# Patient Record
Sex: MALE | Race: BLACK OR AFRICAN AMERICAN | Employment: FULL TIME | URBAN - METROPOLITAN AREA
[De-identification: names, ages, dates, MRNs, and addresses within clinical notes are randomized per-mention and may not be internally consistent; named-entity substitution may affect disease eponyms.]

---

## 2017-06-08 ENCOUNTER — HOSPITAL ENCOUNTER (OUTPATIENT)
Dept: GENERAL RADIOLOGY | Age: 52
Discharge: HOME | End: 2017-06-08
Payer: COMMERCIAL

## 2017-06-08 DIAGNOSIS — J44.1 OBSTRUCTIVE CHRONIC BRONCHITIS WITH EXACERBATION (HCC): ICD-10-CM

## 2017-06-08 PROCEDURE — 71020 XR CHEST PA LAT: CPT

## 2017-08-07 ENCOUNTER — HOSPITAL ENCOUNTER (OUTPATIENT)
Dept: PHYSICAL THERAPY | Age: 52
Discharge: HOME | End: 2017-08-07
Payer: COMMERCIAL

## 2017-08-07 PROCEDURE — 97161 PT EVAL LOW COMPLEX 20 MIN: CPT

## 2017-08-07 PROCEDURE — 97110 THERAPEUTIC EXERCISES: CPT

## 2017-08-07 NOTE — PROGRESS NOTES
PT DAILY TREATMENT NOTE/FOOT AND ANKLE EVAL 3-16    Patient Name: Ari Delacruz  Date:2017  : 1965  [x]  Patient  Verified  Payor: BLUE CROSS / Plan: [facility] PPO / Product Type: PPO /    In time: 9:05  Out time: 9:46  Total Treatment Time (min): 33   Total Timed Codes (min): 8  Visit #: 1 of 8    Treatment Area: Pain of left heel [M79.672]  Plantar fasciitis of left foot [M72.2]    SUBJECTIVE  Pain Level (0-10 scale): 410  []constant [x]intermittent []improving []worsening []no change since onset    Any medication changes, allergies to medications, adverse drug reactions, diagnosis change, or new procedure performed?: [x] No    [] Yes (see summary sheet for update)  Subjective functional status/changes:     PLOF: Ind with ADLs/IADLs, played softball, playing with grandchildren   Limitations to PLOF: Pain limits time spent on his feet   Mechanism of Injury: S/P plantar fascitis (); pain started a couple months before surgery, went from a gradual ache to aching all the time, the same doctor performed the plantar fascitis surgery on his right foot  Current symptoms/Complaints: Pain on medial border and back of L foot  Previous Treatment/Compliance: Pt reports he has been Icing his foot when he thinks about it, has been wearing boot up until last week  PMHx/Surgical Hx:  R foot plantar fascitis surgery, Arthroscopic surgery on R knee for torn cartilage    Work Hx:  for [employer], requires being on feet for long periods of  time    Living Situation: lives with wife and son in a  two-story home with bedroom downstairs   Pt Goals:  To be pain free  Barriers: []pain []financial []time []transportation []other  Motivation: High  Substance use: []Alcohol [x]Tobacco []other: smokes 1/2 pack   FABQ Score: []low []elevate  Cognition: A & O x 4    Other:    OBJECTIVE/EXAMINATION    33 min [x]Eval                  []Re-Eval       8 min Therapeutic Exercise:  AROM/Gross strength of ankle/hip/knee   Rationale: increase ROM and increase strength to improve the patients ability to perform ADLs          With   [x] TE   [] TA   [] neuro   [] other: Patient Education: [x] Review HEP    [] Progressed/Changed HEP based on:   [] positioning   [] body mechanics   [] transfers   [] heat/ice application    [] other:      Other Objective/Functional Measures:     Physical Therapy Evaluation  - Foot and Ankle    Gait: [] Normal    [x] Abnormal    [] Antalgic    [] NWB    Device:    Describe: Pt ambulates with antalgic gait increased time on R LE with excessive pronation of L foot     ROM/Strength  [] Unable to assess at this time      AROM        PROM            Strength (1-5)   Left Right Left Right Left  Right   Dorsiflexion -19 -14   4- 4+   Plantarflexion 28 32   4- 4+   Inversion 22 29   4- 4   Eversion 6 8   4- 4   Great Toe Ext     4+ 4+   Great Toe Flex         Gross Strength:   B Hip Flexion: 4+/5   B Knee Extension: 5/5   B Knee Flexion: 4+/5    Flexibility: [] Unable to assess at this time  Gastroc:    (L) Tightness [] WNL   [] Min   [x] Mod   [] Severe    (R) Tightness [] WNL   [] Min   [] Mod   [] Severe  Soleus:    (L) Tightness [] WNL   [] Min   [x] Mod   [] Severe    (R) Tightness [] WNL   [] Min   [x] Mod   [] Severe  Other: Hamstring     (L) Tightness [] WNL   [] Min   [x] Mod   [] Severe    (R) Tightness [] WNL   [] Min   [x] Mod   [] Severe    Palpation:   Location: Medial/lateral border of foot  Patient's Pain Response: [] Min   [x] Mod   [] Severe  Location: Calcaneus  Patient's Pain Response: [] Min   [x] Mod   [] Severe    Optional Tests:  Balance/Stork Test: touches/60sec (L): (R):    Single Leg Hop:   (L) Distance(ft):  (R) Distance(ft):  (L)/(R)%:   (L) Time(sec):  (R) Time(sec): (L)/(R)%:      Sub-talor alignment: [] Neutral     [x] Pronation      [] Supination    Forefoot alignment:  [] Neutral     [] Varus            [x] Valgus    Swelling:   Left (cm) Right (cm) Figure 8:     Midfoot:      Malleoli Level:     MTH:        Anterior Drawer: [x] Neg    [] Pos  Posterior Drawer:  [] Neg    [] Pos  Inversion Stress:  [x] Neg    [] Pos  Talar Tilt:   [x] Neg    [] Pos  Eversion Stress:  [x] Neg    [] Pos  Teresa's Sign:  [] Neg    [] Pos  Caceres Test: [] Neg    [] Pos    Other tests/ comments:       Pain Level (0-10 scale) post treatment: 4/10    ASSESSMENT/Changes in Function:      See POC    Patient will continue to benefit from skilled PT services to modify and progress therapeutic interventions, address functional mobility deficits, address ROM deficits, address strength deficits and analyze and address soft tissue restrictions to attain remaining goals.      [x]  See Plan of Care  []  See progress note/recertification  []  See Discharge Summary         Progress towards goals / Updated goals:    See POC    PLAN  []  Upgrade activities as tolerated     [x]  Continue plan of care  []  Update interventions per flow sheet       []  Discharge due to:_  []  Other:Alondra Stoddard 8/7/2017  9:02 AM

## 2017-08-07 NOTE — PROGRESS NOTES
[facility] [place] [employer] [facility] [employer]  [address] [facility]  [phone] fax    Plan of Care/ Statement of Necessity for Physical Therapy Services    Patient name: Lety Shaikh Start of Care: 2017   Referral source: Bryanna David DPM : 1965    Medical Diagnosis: Pain of left heel [M79.672]  Plantar fasciitis of left foot [M72.2]   Onset Date:    Treatment Diagnosis: S/P Plantar Fascitis    Prior Hospitalization: see medical history Provider#: [id number]   Medications: Verified on Patient summary List    Comorbidities: HTN, asthma    Prior Level of Function: Ind with ADLs/IADLs, plays softball, playing with grandchildren    The Plan of Care and following information is based on the information from the initial evaluation. Assessment/ key information: Pt is a 49 y.o. M who c/o L heel pain s/p plantar fascitis surgery on 17. Pt reports the pain in his L foot started a couple months before the surgery, going from a gradual ache to aching all the time. Post surgery, pt has been walking in a boot up until last week. Pt reports he has been icing it when he remembers and denies use of pain medication. Currently pt has mild pain on medial border and back of his foot. Pt has not returned to work since surgery and most of his limitations are related to standing/walking for >15 minutes and stair negotiation. Pt has weakness and decreased AROM in L ankle dorsiflexion/plantar flexion, tight B gastroc/soleus, and tight hamstrings. Pt also reports occasional pins and needles at the bottom of L foot when standing/walking for extended periods of time. Pt ambulates with antalgic gait with increased time on R LE and excessive pronation of L foot. Pt would benefit from skille PT in order to increase L ankle ROM and strength, improve walking mechanics, and calf/hamstring flexibility in order to return to work.        Evaluation Complexity History MEDIUM Complexity : 1-2 comorbidities / personal factors will impact the outcome/ POC ; Examination LOW Complexity : 1-2 Standardized tests and measures addressing body structure, function, activity limitation and / or participation in recreation  ;Presentation LOW Complexity : Stable, uncomplicated  ;Clinical Decision Making MEDIUM Complexity : FOTO score of 26-74  Overall Complexity Rating: LOW   Problem List: pain affecting function, decrease ROM, decrease strength and decrease flexibility/ joint mobility   Treatment Plan may include any combination of the following: Therapeutic exercise, Therapeutic activities, Neuromuscular re-education, Gait/balance training, Manual therapy and Stair training  Patient / Family readiness to learn indicated by: asking questions, trying to perform skills and interest  Persons(s) to be included in education: patient (P)  Barriers to Learning/Limitations: None  Patient Goal (s): To have less pain  Patient Self Reported Health Status: good  Rehabilitation Potential: excellent    Short Term Goals: To be accomplished in 1 weeks:   1. Pt will demonstrate compliance with HEP in order to continue therapy at home. Long Term Goals: To be accomplished in 4 weeks:   1. Pt will increase FOTO score by 24 points in order to demonstrate in increase in function    2. Pt will increase AROM of L ankle dorsiflexion to neutral in order to improve heel strike during ambulation. 3. Pt will increase L ankle plantar flexion to 4+/5 in order to improve walking mechanics. 4. Pt will ambulate with equal time on B LE with proper heel strike in order to return to work. Frequency / Duration: Patient to be seen 2 times per week for 4 weeks.     Patient/ CarPatient/ Caregiver education and instruction: Diagnosis, prognosis, self care, activity modification and exercises   [x]  Plan of care has been reviewed with MARIE Mccormack 8/7/2017 10:10 AM    ________________________________________________________________________    I certify that the above Therapy Services are being furnished while the patient is under my care. I agree with the treatment plan and certify that this therapy is necessary.     Physician's Signature:____________________  Date:____________Time: _________    Please sign and return to [facility] [employer]  [address] [place] [address]  [phone] fax

## 2017-08-10 ENCOUNTER — HOSPITAL ENCOUNTER (OUTPATIENT)
Dept: PHYSICAL THERAPY | Age: 52
Discharge: HOME | End: 2017-08-10
Payer: COMMERCIAL

## 2017-08-10 PROCEDURE — 97110 THERAPEUTIC EXERCISES: CPT

## 2017-08-10 NOTE — PROGRESS NOTES
PT DAILY TREATMENT NOTE - [facility]     Patient Name: Sade Kothari  Date:8/10/2017  : 1965  [x]  Patient  Verified  Payor: BLUE CROSS / Plan: [facility] PPO / Product Type: PPO /    In time: 8:33  Out time: 9:06  Total Treatment Time (min): 33  Total Timed Codes (min): 33  Visit #: 2 of 8    Treatment Area: Pain of left heel [M79.672]  Plantar fasciitis of left foot [M72.2]    SUBJECTIVE  Pain Level (0-10 scale): 6-7/10  Any medication changes, allergies to medications, adverse drug reactions, diagnosis change, or new procedure performed?: [x] No    [] Yes (see summary sheet for update)  Subjective functional status/changes:   [] No changes reported  Pt reports he is feeling good this morning. He states he has not started doing all his exercise yet but did do the calf stretch where you lunge with your hands on the wall and noticed how tight his calves were. OBJECTIVE    33 min Therapeutic Exercise:  [x] See flow sheet :   Rationale: increase ROM, increase strength and improve coordination to improve the patients ability to perform ADLs    With   [x] TE   [] TA   [] neuro   [] other: Patient Education: [x] Review HEP    [] Progressed/Changed HEP based on:   [] positioning   [] body mechanics   [] transfers   [] heat/ice application    [] other:      Other Objective/Functional Measures:     Pt required cues for short foot   Pt tolerated all exercises with no pain  Pt has very tight gastroc and HS    Pain Level (0-10 scale) post treatment: 5.5/10    ASSESSMENT/Changes in Function:     Pt has initiated therapy but has not yet verbalized compliance with HEP    Patient will continue to benefit from skilled PT services to modify and progress therapeutic interventions, address functional mobility deficits, address ROM deficits, address strength deficits, analyze and address soft tissue restrictions and analyze and cue movement patterns to attain remaining goals.      [x]  See Plan of Care  []  See progress note/recertification  []  See Discharge Summary         Progress towards goals / Updated goals:    Short Term Goals: To be accomplished in 1 weeks:                         1. Pt will demonstrate compliance with HEP in order to continue therapy at home. Not met: pt. Reports he hasn't started HEP yet (8/10/17)  Long Term Goals: To be accomplished in 4 weeks:                         1. Pt will increase FOTO score by 24 points in order to demonstrate in increase in function                          2. Pt will increase AROM of L ankle dorsiflexion to neutral in order to improve heel strike during ambulation. 3. Pt will increase L ankle plantar flexion to 4+/5 in order to improve walking mechanics. 4. Pt will ambulate with equal time on B LE with proper heel strike in order to return to work.     PLAN  []  Upgrade activities as tolerated     [x]  Continue plan of care  []  Update interventions per flow sheet       []  Discharge due to:_  []  Other:_      Kathy Hernandez 8/10/2017  9:08 AM    Future Appointments  Date Time Provider Nate Ramachandran   8/14/2017 9:00 AM Dannielle Cartagena, PT [id number] [facility]   8/17/2017 11:00 AM Juanita Monroy, PT [id number] [facility]   8/21/2017 9:30 AM Ann Darby, PT [id number] [facility]   8/24/2017 9:00 AM Dannielle Cartagena, PT [id number] [facility]   8/28/2017 9:00 AM Ann Darby, PT [id number] [facility]   8/31/2017 9:00 AM Dannielle Cartagena, PT [id number] [facility]

## 2017-08-14 ENCOUNTER — HOSPITAL ENCOUNTER (OUTPATIENT)
Dept: PHYSICAL THERAPY | Age: 52
Discharge: HOME | End: 2017-08-14
Payer: COMMERCIAL

## 2017-08-14 PROCEDURE — 97110 THERAPEUTIC EXERCISES: CPT

## 2017-08-14 NOTE — PROGRESS NOTES
PT DAILY TREATMENT NOTE - [facility]     Patient Name: Vipul Gomez  Date:2017  : 1965  [x]  Patient  Verified  Payor: BLUE CROSS / Plan: [facility] PPO / Product Type: PPO /    In time: 9:02  Out time: 9:32  Total Treatment Time (min): 30  Total Timed Codes (min): 30   Visit #: 3 of 8    Treatment Area: Pain of left heel [M79.672]  Plantar fasciitis of left foot [M72.2]    SUBJECTIVE  Pain Level (0-10 scale): 8/10  Any medication changes, allergies to medications, adverse drug reactions, diagnosis change, or new procedure performed?: [x] No    [] Yes (see summary sheet for update)  Subjective functional status/changes:   [] No changes reported  Pt reports his is in quite of bit of pain this morning. 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 He reports he tried doing the exercises at home but was in too much pain over the weekend. OBJECTIVE     25 min Therapeutic Exercise:  [x] See flow sheet :   Rationale: increase ROM, increase strength and improve balance to improve the patients ability to perform ADLs    5 min Manual Therapy:  STM to plantar surface of foot, digits 1-5 mobilization   Rationale: decrease pain, increase ROM, increase tissue extensibility and decrease edema  to increase QOL    With   [x] TE   [] TA   [] neuro   [] other: Patient Education: [x] Review HEP    [] Progressed/Changed HEP based on:   [] positioning   [] body mechanics   [] transfers   [] heat/ice application    [] other:      Other Objective/Functional Measures:     Muscle guarding on HS and gastroc stretch  Pt had increased difficulty with towel pick-ups  Pt had decreased pain post-therapy        Pain Level (0-10 scale) post treatment: 6/10  ASSESSMENT/Changes in Function:     Pt is making slow progress toward goals. Pt had an eventful weekend with lots of time spent on his feet.  Pt seemed fatigued today and had increased pain likely due to increased activity over the weekend. Pt had yet to complete all of his exercises at home. Pt education provided to make time for rest to allow foot to heal as well as stretching his HS and gastroc. PT to continue strengthening intrinsic foot muscles and stretching HS and gastroc to increase flexibility and standing/ambulation tolerance. Patient will continue to benefit from skilled PT services to modify and progress therapeutic interventions, address functional mobility deficits, address ROM deficits, address strength deficits, analyze and cue movement patterns and analyze and modify body mechanics/ergonomics to attain remaining goals. [x]  See Plan of Care  []  See progress note/recertification  []  See Discharge Summary         Progress towards goals / Updated goals:  Short Term Goals: To be accomplished in 1 weeks:                         1. Pt will demonstrate compliance with HEP in order to continue therapy at home. Not met: pt. Attempted but too painful (8/14/17)  Long Term Goals: To be accomplished in 4 weeks:                         2. Pt will increase FOTO score by 24 points in order to demonstrate in increase in function                          2. Pt will increase AROM of L ankle dorsiflexion to neutral in order to improve heel strike during ambulation.                         3. Pt will increase L ankle plantar flexion to 4+/5 in order to improve walking mechanics.                        5. Pt will ambulate with equal time on B LE with proper heel strike in order to return to work.     PLAN  []  Upgrade activities as tolerated     [x]  Continue plan of care  []  Update interventions per flow sheet       []  Discharge due to:_  []  Other:_      Xi Bermudezehan 8/14/2017  8:54 AM    Future Appointments  Date Time Provider Nate Ramachandran   8/14/2017 9:00 AM Summer Casey, PT MMCPTPB [address]   8/17/2017 11:00 AM Coreen Gloriann Heimlich, PT RUPESH [address]   8/21/2017 9:30 AM Floyd Barba, PT TU [address]   8/24/2017 9:00 AM Dannielle Cartagena, PT MMCPTPB [facility]   8/28/2017 9:00 AM Ann Darby, PT [id number] [facility]   8/31/2017 9:00 AM Dannielle Cartagena, PT [id number] [facility]

## 2017-08-17 ENCOUNTER — HOSPITAL ENCOUNTER (OUTPATIENT)
Dept: PHYSICAL THERAPY | Age: 52
Discharge: HOME | End: 2017-08-17
Payer: COMMERCIAL

## 2017-08-17 PROCEDURE — 97110 THERAPEUTIC EXERCISES: CPT

## 2017-08-17 NOTE — PROGRESS NOTES
PT DAILY TREATMENT NOTE - [facility]     Patient Name: Nancy Robins  Date:2017  : 1965  [x]  Patient  Verified  Payor: BLUE CROSS / Plan: [id number] PPO / Product Type: PPO /    In time:1102  Out time:1140  Total Treatment Time (min): 38  Total Timed Codes (min): 38  1:1 Treatment Time ( only): 23   Visit #: 4 of 8    Treatment Area: Pain of left heel [M79.672]  Plantar fasciitis of left foot [M72.2]    SUBJECTIVE  Pain Level (0-10 scale): 5/10  Any medication changes, allergies to medications, adverse drug reactions, diagnosis change, or new procedure performed?: [x] No    [] Yes (see summary sheet for update)  Subjective functional status/changes:   [] No changes reported  Reports he has good and bad days depending on what he does the day before. OBJECTIVE      38 min Therapeutic Exercise:  [] See flow sheet :   Rationale: increase ROM, increase strength, improve coordination and improve balance to improve the patients ability to ease with ADL's          With   [] TE   [] TA   [] neuro   [] other: Patient Education: [x] Review HEP    [] Progressed/Changed HEP based on:   [] positioning   [] body mechanics   [] transfers   [] heat/ice application    [] other:      Other Objective/Functional Measures:   DF at neutral in seated position. Good tolerance to ex's     Pain Level (0-10 scale) post treatment: 5/10    ASSESSMENT/Changes in Function: Pt educated on scar massage because he feels pulling on the scar and under his foot. Pt reports progress in therapy.      Patient will continue to benefit from skilled PT services to modify and progress therapeutic interventions, address functional mobility deficits, address ROM deficits, address strength deficits, analyze and address soft tissue restrictions, analyze and cue movement patterns, analyze and modify body mechanics/ergonomics, assess and modify postural abnormalities and address imbalance/dizziness to attain remaining goals.     []  See Plan of Care  []  See progress note/recertification  []  See Discharge Summary         Progress towards goals / Updated goals:  Short Term Goals: To be accomplished in 1 weeks:                         1. Pt will demonstrate compliance with HEP in order to continue therapy at home. Not met: pt. Attempted but too painful (8/14/17)  Long Term Goals: To be accomplished in 4 weeks:                         1. Pt will increase FOTO score by 24 points in order to demonstrate in increase in function                          2. Pt will increase AROM of L ankle dorsiflexion to neutral in order to improve heel strike during ambulation. MET. 8/17/17                         3. Pt will increase L ankle plantar flexion to 4+/5 in order to improve walking mechanics.                          1. Pt will ambulate with equal time on B LE with proper heel strike in order to return to work.       PLAN  [x]  Upgrade activities as tolerated     [x]  Continue plan of care  []  Update interventions per flow sheet       []  Discharge due to:_  []  Other:_      Krunal Rosas PT 8/17/2017  11:49 AM    Future Appointments  Date Time Provider Nate Ramachandran   8/21/2017 9:30 AM Krunal Rosas PT MMCPTPB [address]   8/24/2017 9:00 AM Jacqualine Schaumann, PT MMCPTPB [address]   8/28/2017 9:00 AM Krunal Rosas PT [id number] [address]   8/31/2017 9:00 AM Jacqualine Schaumann, PT MMCPTPB [address]

## 2017-08-21 ENCOUNTER — HOSPITAL ENCOUNTER (OUTPATIENT)
Dept: PHYSICAL THERAPY | Age: 52
Discharge: HOME | End: 2017-08-21
Payer: COMMERCIAL

## 2017-08-21 PROCEDURE — 97110 THERAPEUTIC EXERCISES: CPT

## 2017-08-21 NOTE — PROGRESS NOTES
PT DAILY TREATMENT NOTE     Patient Name: Maya Carty  Date:2017  : 1965  [x]  Patient  Verified  Payor: BLUE CROSS / Plan: Lala Marcus [id number] PPO / Product Type: PPO /    In time: 9:30  Out time: 10:09  Total Treatment Time (min): 39  Visit #: 5 of 8    Treatment Area: Pain of left heel [M79.672]  Plantar fasciitis of left foot [M72.2]    SUBJECTIVE  Pain Level (0-10 scale): 3/10  Any medication changes, allergies to medications, adverse drug reactions, diagnosis change, or new procedure performed?: [x] No    [] Yes (see summary sheet for update)  Subjective functional status/changes:   [] No changes reported  \"My ankle swell up a little bit more than usual    OBJECTIVE     39 min Therapeutic Exercise:  [] See flow sheet :   Rationale: increase ROM, increase strength, improve coordination and improve balance to improve the patients ability to ease with ADL's        With   [] TE   [] TA   [] neuro   [] other: Patient Education: [x] Review HEP    [] Progressed/Changed HEP based on:   [] positioning   [] body mechanics   [] transfers   [] heat/ice application    [] other:      Other Objective/Functional Measures:     Started with bike today, no difficulty with bike    Poor eccentric control with ankle 4 ways   Audible popping with IV & EV of L ankle      Min challenged with BAPs rotation    FOTO: 45/100    Pain Level (0-10 scale) post treatment: 3/10    ASSESSMENT/Changes in Function: Pt making slow progress towards goals after 5 visits. He was able to perform DF AROM to neutral. However, he has poor carry over and cont to demonstrate flat feet during amb, especially with L side. He presented today at PT session with min improved strength and endurance of B ankles with increased repetition of Destiney, decreased pain level, improved coordination of L foot. Pt would cont to benefit from skilled PT to iimproved his B LEs strength, balance, gait pattern and amb tolerance.      Patient will continue to benefit from skilled PT services to modify and progress therapeutic interventions, address functional mobility deficits, address ROM deficits, address strength deficits, analyze and address soft tissue restrictions, analyze and cue movement patterns, analyze and modify body mechanics/ergonomics, assess and modify postural abnormalities and address imbalance/dizziness to attain remaining goals.      [x]  See Plan of Care  []  See progress note/recertification  []  See Discharge Summary      Progress towards goals / Updated goals:  Short Term Goals: To be accomplished in 1 weeks:                         1. Pt will demonstrate compliance with HEP in order to continue therapy at home. Not met: pt. Attempted but too painful (8/14/17)  Long Term Goals: To be accomplished in 4 weeks:                         1. Pt will increase FOTO score by 24 points in order to demonstrate in increase in function progressing, increased 8 points 8-21-17                         2. Pt will increase AROM of L ankle dorsiflexion to neutral in order to improve heel strike during ambulation. MET. 8/17/17                         3. Pt will increase L ankle plantar flexion to 4+/5 in order to improve walking mechanics. Not met, remained 4-/5 8-21-17                         4. Pt will ambulate with equal time on B LE with proper heel strike in order to return to work.  Not met, cont to demonstrate flat foot with L LE 8-21-17        PLAN  [x]  Upgrade activities as tolerated     []  Continue plan of care  []  Update interventions per flow sheet       []  Discharge due to:_  []  Other:_      Randy Boyce, PT 8/21/2017  7:53 AM    Future Appointments  Date Time Provider Nate Ramachandran   8/21/2017 9:30 AM Alex To [facility]   8/24/2017 9:00 AM Madhav Mcgill PT [facility]   8/28/2017 9:00 AM Manuela Gee, GALILEA [facility]   8/31/2017 9:00 AM Madhav Mcgill PT [id number]

## 2017-08-21 NOTE — PROGRESS NOTES
[facility] - [employer] [facility]  [address] [place] [address]  [phone] fax    Physical Therapy Progress Note    Patient name: Dyllan Martin Start of Care: 2017   Referral source: Sally Waters DPM : 1965                          Medical Diagnosis: Pain of left heel [M79.672]  Plantar fasciitis of left foot [M72.2] Onset Date:    Treatment Diagnosis: S/P Plantar Fascitis    Prior Hospitalization: see medical history Provider#: [id number]   Medications: Verified on Patient summary List    Comorbidities: HTN, asthma    Prior Level of Function: Ind with ADLs/IADLs, plays softball, playing with grandchildren    Visits from Start of Care: 5    Missed Visits: 0    Established Goals:         Excellent           Good         Limited           None  [x] Increased ROM   [x]  []  []  []  [x] Increased Strength  []  []  [x]  []  [x] Increased Mobility  []  []  [x]  []   [] Decreased Pain   []  []  []  []  [] Decreased Swelling  []  []  []  []    Key Functional Changes: Improved L ankle AROM, decreased pain level, improved functional mobility level     Updated Goals: to be achieved in 4 weeks:   Short Term Goals: To be accomplished in 1 weeks:                         1. Pt will demonstrate compliance with HEP in order to continue therapy at home. Long Term Goals: To be accomplished in 4 weeks:    1. Pt will increase FOTO score by 24 points in order to demonstrate in increase in function. 2. Pt will ambulate with equal time on B LE with proper heel strike in order to return to work. 3. Pt will increase L ankle plantar flexion to 4+/5 in order to improve walking mechanics. 4. Pt will increase AROM of L ankle dorsiflexion to 5 degrees in order to improve heel strike during ambulation.      ASSESSMENT/RECOMMENDATIONS: Pt making slow progress towards goals after 5 visits.  He was able to perform DF AROM to neutral. However, he has poor carry over and cont to demonstrate flat feet during amb, especially with L side. He presented today at PT session with min improved strength and endurance of B ankles with increased repetition of Destiney, decreased pain level, improved coordination of L foot. Pt would cont to benefit from skilled PT to iimproved his B LEs strength, balance, gait pattern and amb tolerance. [x]Continue therapy per initial plan/protocol at a frequency of  2 x per week for 4 weeks  []Continue therapy with the following recommended changes:_____________________      _____________________________________________________________________  []Discontinue therapy progressing towards or have reached established goals  []Discontinue therapy due to lack of appreciable progress towards goals  []Discontinue therapy due to lack of attendance or compliance  []Await Physician's recommendations/decisions regarding therapy  []Other:________________________________________________________________    Thank you for this referral.   Jessica Alejandro, PT 8/21/2017 1:55 PM    NOTE TO PHYSICIAN:  [address] TO [facility] Physical Therapy: [phone]  If you are unable to process this request in 24 hours please contact our office: [phone] I have read the above report and request that my patient continue as recommended. ? I have read the above report and request that my patient continue therapy with the following changes/special instructions:____________________________________  ? I have read the above report and request that my patient be discharged from therapy.     Physicians signature: ______________________________Date: ______Time:______

## 2017-08-24 ENCOUNTER — HOSPITAL ENCOUNTER (OUTPATIENT)
Dept: PHYSICAL THERAPY | Age: 52
Discharge: HOME | End: 2017-08-24
Payer: COMMERCIAL

## 2017-08-24 PROCEDURE — 97110 THERAPEUTIC EXERCISES: CPT

## 2017-08-24 NOTE — PROGRESS NOTES
PT DAILY TREATMENT NOTE - [facility]     Patient Name: Joy Jung  Date:2017  : 1965  [x]  Patient  Verified  Payor: BLUE CROSS / Plan: Lala Marcus [id number] PPO / Product Type: PPO /    In time: 8:58  Out time: 9:29  Total Treatment Time (min): 32  Total Timed Codes (min): 32  1:1 Treatment Time ( only): 32  Visit #: 6 of 8     Treatment Area: Pain of left heel [M79.672]  Plantar fasciitis of left foot [M72.2]    SUBJECTIVE  Pain Level (0-10 scale): 3.5/10  Any medication changes, allergies to medications, adverse drug reactions, diagnosis change, or new procedure performed?: [x] No    [] Yes (see summary sheet for update)  Subjective functional status/changes:   [] No changes reported  Pt reports he had a lot of unexplainable swelling last weekend. The swelling has since went down but the reason for the swelling is unknown. Pt has a follow up appointment with his doctor for his foot today. OBJECTIVE    32 min Therapeutic Exercise:  [x] See flow sheet :   Rationale: increase ROM, increase strength, improve coordination and improve balance to improve the patients ability to perform ADLs    With   [x] TE   [] TA   [] neuro   [] other: Patient Education: [x] Review HEP    [] Progressed/Changed HEP based on:   [] positioning   [] body mechanics   [] transfers   [] heat/ice application    [] other:      Other Objective/Functional Measures:     L Ankle Dorsiflexion: 0 degrees  L Ankle Plantar flexion: 27 degrees   MMT Ankle Dorsiflexion/Plantar flexion 4+/5  Pt reports he feels a \"knot\" on the medial border of his calcaneous during ankle AROM    Pain Level (0-10 scale) post treatment: 3/10    ASSESSMENT/Changes in Function:     Pt is making slow progress toward goals. Pt has improved ankle strength but continues to be limited in his ROM. PT to continue strengthening intrinsic foot muscles and stretching HS and gastroc to increase flexibility and standing/ambulation tolerance.     Patient will continue to benefit from skilled PT services to modify and progress therapeutic interventions, address functional mobility deficits, address ROM deficits, address strength deficits, analyze and address soft tissue restrictions and analyze and cue movement patterns to attain remaining goals. [x]  See Plan of Care  []  See progress note/recertification  []  See Discharge Summary         Progress towards goals / Updated goals:  Short Term Goals: To be accomplished in 1 weeks:    1. Pt will demonstrate compliance with HEP in order to continue therapy at home. Long Term Goals: To be accomplished in 4 weeks:    1. Pt will increase FOTO score by 24 points in order to demonstrate in increase in function. 2. Pt will ambulate with equal time on B LE with proper heel strike in order to return to work. 3. Pt will increase L ankle plantar flexion to 4+/5 in order to improve walking mechanics. Met (8/24/17)    4. Pt will increase AROM of L ankle dorsiflexion to 5 degrees in order to improve heel strike during ambulation.   Not Met: 0 degrees (8/24/17)    PLAN  []  Upgrade activities as tolerated     [x]  Continue plan of care  []  Update interventions per flow sheet       []  Discharge due to:_  []  Other:_      Uche Jamal 8/24/2017  8:56 AM    Future Appointments  Date Time Provider Nate Ramachandran   8/24/2017 9:00 AM Jacqualine Schaumann, PT [facility]   8/28/2017 9:00 AM Krunal Rosas PT [facility]   8/31/2017 9:00 AM Jacqualine Schaumann, PT [facility]

## 2017-08-24 NOTE — PROGRESS NOTES
[facility] - [address]  [phone] fax    Physical Therapy Progress Note  Patient name: Nancy Robins Start of Care:  17   Referral source: Carlos Valenzuela DPM : 1965   Medical/Treatment Diagnosis: Pain of left heel [M79.672]  Plantar fasciitis of left foot [M72.2] Onset Date: 2016     Prior Hospitalization: see medical history Provider#: [id number]   Medications: Verified on Patient Summary List    Comorbidities:  HTN, asthma  Prior Level of Function Ind with ADLs/IADLs, plays softball, playing with grandchildren  Visits from Start of Care: 6    Missed Visits: 0    Established Goals:         Excellent           Good         Limited           None  [] Increased ROM   []  []  []  []  [x] Increased Strength  []  []  [x]  []  [x] Increased Mobility  []  []  [x]  []   [] Decreased Pain   []  []  []  []  [] Decreased Swelling  []  []  []  []    Key Functional Changes:  Pt.was only seen for 1 visit since last progress note. He continues to have decreased L ankle DF at 0 degrees. L ankle PF was 27 degrees. L ankle DF/PF strength was 4+/5 in seated position. FOTO score also improved slightly to 45 points. He continues to complain of pain at 3-4/10. Skilled PT is medically necessary in order to improve L ankle mobility and strength for improved quality of life. Updated Goals: to be achieved in 4 weeks:  1. Pt will increase FOTO score by 24 points in order to demonstrate in increase in function. 2. Pt will ambulate with equal time on B LE with proper heel strike in order to return to work. 3. Pt will increase L ankle plantar flexion to 4+/5 (in standing) in order to improve walking mechanics. 4. Pt will increase AROM of L ankle dorsiflexion to 5 degrees in order to improve heel strike during ambulation.     ASSESSMENT/RECOMMENDATIONS:  [x]Continue therapy per initial plan/protocol at a frequency of  2 x per week for 4 weeks  []Continue therapy with the following recommended changes:_____________________      _____________________________________________________________________  []Discontinue therapy progressing towards or have reached established goals  []Discontinue therapy due to lack of appreciable progress towards goals  []Discontinue therapy due to lack of attendance or compliance  []Await Physician's recommendations/decisions regarding therapy  []Other:________________________________________________________________    Thank you for this referral.   Grisel Reddy, PT 8/24/2017 12:51 PM    NOTE TO PHYSICIAN:  Abigail Olson [address]   FAX TO [facility] Physical Therapy: [phone]  If you are unable to process this request in 24 hours please contact our office: [phone]    ? I have read the above report and request that my patient continue as recommended. ? I have read the above report and request that my patient continue therapy with the following changes/special instructions:____________________________________  ? I have read the above report and request that my patient be discharged from therapy.     Physicians signature: ______________________________Date: ______Time:______

## 2017-08-28 ENCOUNTER — HOSPITAL ENCOUNTER (OUTPATIENT)
Dept: PHYSICAL THERAPY | Age: 52
Discharge: HOME | End: 2017-08-28
Payer: COMMERCIAL

## 2017-08-28 PROCEDURE — 97140 MANUAL THERAPY 1/> REGIONS: CPT

## 2017-08-28 PROCEDURE — 97110 THERAPEUTIC EXERCISES: CPT

## 2017-08-28 NOTE — PROGRESS NOTES
PT DAILY TREATMENT NOTE     Patient Name: Chris Zafar  Date:2017  : 1965  [x]  Patient  Verified  Payor: BLUE CROSS / Plan: [facility] PPO / Product Type: PPO /    In time:904  Out time:944  Total Treatment Time (min): 40  Visit #: 7 of 8    Treatment Area: Pain of left heel [M79.672]  Plantar fasciitis of left foot [M72.2]    SUBJECTIVE  Pain Level (0-10 scale): 2-3/10  Any medication changes, allergies to medications, adverse drug reactions, diagnosis change, or new procedure performed?: [x] No    [] Yes (see summary sheet for update)  Subjective functional status/changes:   [] No changes reported  Most pain is upon standing in the morning and too much standing. OBJECTIVE      32 min Therapeutic Exercise:  [] See flow sheet :   Rationale: increase ROM, increase strength and improve balance to improve the patients ability to ease with ADL's    8 min Manual Therapy:  STM to peroneals and gastroc, Mobs to TC and fibula mobs/post.   Rationale: decrease pain, increase ROM and decrease trigger points to ease with ADL's         With   [] TE   [] TA   [] neuro   [] other: Patient Education: [x] Review HEP    [] Progressed/Changed HEP based on:   [] positioning   [] body mechanics   [] transfers   [] heat/ice application    [] other:      Other Objective/Functional Measures: Performed L SLS x 30 sec w/o LOB  Foam x 30 EC w/o LOB  Limited EVersion and DF. Palpable tenderness to gastroc and peroneals with trigger points  Pain Level (0-10 scale) post treatment: 2-3/5    ASSESSMENT/Changes in Function: Progressing well but continues to be guarded. He is semicompliant with HEP. Pt was given a GTB and was re-educated on importance of HEP. Pt to self massage calf and perform stretches prior to getting out of bed.     Patient will continue to benefit from skilled PT services to modify and progress therapeutic interventions, address functional mobility deficits, address ROM deficits, address strength deficits, analyze and address soft tissue restrictions, analyze and cue movement patterns, analyze and modify body mechanics/ergonomics, assess and modify postural abnormalities and address imbalance/dizziness to attain remaining goals. []  See Plan of Care  []  See progress note/recertification  []  See Discharge Summary         Progress towards goals / Updated goals:    Short Term Goals: To be accomplished in 1 weeks:    1. Pt will demonstrate compliance with HEP in order to continue therapy at home.  [facility] Term Goals: To be accomplished in 4 weeks:    1. Pt will increase FOTO score by 24 points in order to demonstrate in increase in function.    2. Pt will ambulate with equal time on B LE with proper heel strike in order to return to work.   Dwayne Mahoneyrine. Pt will increase L ankle plantar flexion to 4+/5 in order to improve walking mechanics. Met (8/24/17)    4. Pt will increase AROM of L ankle dorsiflexion to 5 degrees in order to improve heel strike during ambulation.   Not Met: 0 degrees (8/24/17)  PLAN  []  Upgrade activities as tolerated     []  Continue plan of care  []  Update interventions per flow sheet       []  Discharge due to:_  []  Other:_      Manuela Gee, PT 8/28/2017  9:11 AM    Future Appointments  Date Time Provider Nate Ramachandran   8/31/2017 9:00 AM Madhav Mcgill, PT [facility]

## 2017-08-31 ENCOUNTER — HOSPITAL ENCOUNTER (OUTPATIENT)
Dept: PHYSICAL THERAPY | Age: 52
Discharge: HOME | End: 2017-08-31
Payer: COMMERCIAL

## 2017-08-31 PROCEDURE — 97110 THERAPEUTIC EXERCISES: CPT

## 2017-08-31 NOTE — PROGRESS NOTES
PT DAILY TREATMENT NOTE     Patient Name: Maya Carty  Date:2017  : 1965  [x]  Patient  Verified  Payor: BLUE CROSS / Plan: [facility] PPO / Product Type: PPO /    In time:857  Out time:927  Total Treatment Time (min): 30  Visit #: 8 of 8    Treatment Area: Pain of left heel [M79.672]  Plantar fasciitis of left foot [M72.2]    SUBJECTIVE  Pain Level (0-10 scale): 3-4/10  Any medication changes, allergies to medications, adverse drug reactions, diagnosis change, or new procedure performed?: [x] No    [] Yes (see summary sheet for update)  Subjective functional status/changes:   [] No changes reported  Pt stated that he has some pain today    OBJECTIVE    30 min Therapeutic Exercise:  [x] See flow sheet :   Rationale: increase ROM and increase strength to improve the patients ability to increase ease with walking    With   [x] TE   [] TA   [] neuro   [] other: Patient Education: [x] Review HEP    [] Progressed/Changed HEP based on:   [] positioning   [] body mechanics   [] transfers   [] heat/ice application    [] other:      Other Objective/Functional Measures:   Pt had no complaint of increased pain with exercises  SLS is challenging  No LOB with foam balance     Pain Level (0-10 scale) post treatment: 3-410    ASSESSMENT/Changes in Function:   Pt cont to progress well toward goals. Pt cont to have slight limp on the L. AROM in L ankle cont to improve. SLS balance is challenging 2* decreased L ankle strength    Patient will continue to benefit from skilled PT services to modify and progress therapeutic interventions, address functional mobility deficits, address ROM deficits and address strength deficits to attain remaining goals.      []  See Plan of Care  [x]  See progress note/recertification  []  See Discharge Summary         Progress towards goals / Updated goals:  Short Term Goals: To be accomplished in 1 weeks:    1. Pt will demonstrate compliance with HEP in order to continue therapy at home.  [facility] Term Goals: To be accomplished in 4 weeks:    1. Pt will increase FOTO score by 24 points in order to demonstrate in increase in function.    2. Pt will ambulate with equal time on B LE with proper heel strike in order to return to work. Not met. Pt cont to limp on the L. 8/31/17    3. Pt will increase L ankle plantar flexion to 4+/5 in order to improve walking mechanics. Met (8/24/17)    4. Pt will increase AROM of L ankle dorsiflexion to 5 degrees in order to improve heel strike during ambulation. Not Met: 0 degrees (8/24/17)    PLAN  []  Upgrade activities as tolerated     [x]  Continue plan of care  []  Update interventions per flow sheet       []  Discharge due to:_  []  Other:_      Yumiko Jaime, MARIE 8/31/2017  9:13 AM    No future appointments.

## 2017-09-06 ENCOUNTER — HOSPITAL ENCOUNTER (OUTPATIENT)
Dept: PHYSICAL THERAPY | Age: 52
Discharge: HOME | End: 2017-09-06
Payer: COMMERCIAL

## 2017-09-06 PROCEDURE — 97110 THERAPEUTIC EXERCISES: CPT

## 2017-09-06 NOTE — PROGRESS NOTES
PT DAILY TREATMENT NOTE     Patient Name: Brigida Situ  Date:2017  : 1965  [x]  Patient  Verified  Payor: BLUE CROSS / Plan: [facility] PPO / Product Type: PPO /    In time:800  Out time:830  Total Treatment Time (min): 30  Visit #: 1 of 8    Treatment Area: Pain of left heel [M79.672]  Plantar fasciitis of left foot [M72.2]    SUBJECTIVE  Pain Level (0-10 scale): 0/10  Any medication changes, allergies to medications, adverse drug reactions, diagnosis change, or new procedure performed?: [x] No    [] Yes (see summary sheet for update)  Subjective functional status/changes:   [] No changes reported  Pt stated that today is his first pain free day    OBJECTIVE    30 min Therapeutic Exercise:  [x] See flow sheet :   Rationale: increase ROM and increase strength to improve the patients ability to increase ease with ADLs    With   [x] TE   [] TA   [] neuro   [] other: Patient Education: [x] Review HEP    [] Progressed/Changed HEP based on:   [] positioning   [] body mechanics   [] transfers   [] heat/ice application    [] other:      Other Objective/Functional Measures:   Pt had no difficulty with exercises  BAPS on level slightly increased ankle pain     Pain Level (0-10 scale) post treatment: -2/10    ASSESSMENT/Changes in Function:   Pt cont to progress well toward goals. Pt is ambulating with better gait pattern. Strength in L ankle cont to improve. Pt had much less difficulty with SLS today    Patient will continue to benefit from skilled PT services to modify and progress therapeutic interventions, address functional mobility deficits, address ROM deficits and address strength deficits to attain remaining goals.      []  See Plan of Care  [x]  See progress note/recertification  []  See Discharge Summary         Progress towards goals / Updated goals:  Short Term Goals: To be accomplished in 1 weeks:    1. Pt will demonstrate compliance with HEP in order to continue therapy at home.  [facility] Term Goals: To be accomplished in 4 weeks:    1. Pt will increase FOTO score by 24 points in order to demonstrate in increase in function.    2. Pt will ambulate with equal time on B LE with proper heel strike in order to return to work. Not met. Pt cont to limp on the L. 8/31/17    3. Pt will increase L ankle plantar flexion to 4+/5 in order to improve walking mechanics. Met (8/24/17)    4. Pt will increase AROM of L ankle dorsiflexion to 5 degrees in order to improve heel strike during ambulation.   Not Met: 0 degrees (8/24/17)    PLAN  []  Upgrade activities as tolerated     [x]  Continue plan of care  []  Update interventions per flow sheet       []  Discharge due to:_  []  Other:_      Noemi Bryson PTA 9/6/2017  7:58 AM    Future Appointments  Date Time Provider Nate Ramachandran   9/6/2017 8:00 AM Noemi Bryson PTA [id number] [facility]   9/8/2017 10:00 AM Adiel Cabrera, PT [id number] [facility]   9/12/2017 9:00 AM Noemi Bryson PTA [id number] [facility]   9/14/2017 9:30 AM Noemi Bryson PTA [id number] [facility]   9/19/2017 9:30 AM Noemi Bryson PTA [id number] [facility]   9/21/2017 10:30 AM Adiel Cabrera, PT [id number] [facility]   9/26/2017 9:30 AM Noemi Bryson PTA [id number] [facility]   9/28/2017 9:30 AM Noemi Bryson PTA [id number] [facility]

## 2017-09-08 ENCOUNTER — HOSPITAL ENCOUNTER (OUTPATIENT)
Dept: PHYSICAL THERAPY | Age: 52
Discharge: HOME | End: 2017-09-08
Payer: COMMERCIAL

## 2017-09-08 PROCEDURE — 97110 THERAPEUTIC EXERCISES: CPT

## 2017-09-08 PROCEDURE — 97140 MANUAL THERAPY 1/> REGIONS: CPT

## 2017-09-08 NOTE — PROGRESS NOTES
PT DAILY TREATMENT NOTE - [facility] 3-16    Patient Name: Maya Carty  Date:2017  : 1965  [x]  Patient  Verified  Payor: BLUE CROSS / Plan: [facility] PPO / Product Type: PPO /    In time: 10:00  Out time: 10:30  Total Treatment Time (min): 30  Total Timed Codes (min): 30     Visit #: 2 of 8    Treatment Area: Pain of left heel [M79.672]  Plantar fasciitis of left foot [M72.2]    SUBJECTIVE  Pain Level (0-10 scale): 3-4/10  Any medication changes, allergies to medications, adverse drug reactions, diagnosis change, or new procedure performed?: [x] No    [] Yes (see summary sheet for update)  Subjective functional status/changes:   [] No changes reported  Pt. Reports he is a little sore today from working out yesterday     OBJECTIVE    20 min Therapeutic Exercise:  [x] See flow sheet :   Rationale: increase ROM and increase strength to improve the patients ability to increase ease of ADLs    10 min Manual Therapy:  Post TC mobs, DTM to plantar fascia   Rationale: decrease pain, increase ROM and increase tissue extensibility to increase ease of ADLs    With   [x] TE   [] TA   [] neuro   [] other: Patient Education: [x] Review HEP    [] Progressed/Changed HEP based on:   [] positioning   [] body mechanics   [] transfers   [] heat/ice application    [] other:      Other Objective/Functional Measures:   L ankle DF AROM: 4 degrees  Pt. Was challenged with eccentric lowering HR/TR  He tolerated PT well with no reports of increased pain    Pain Level (0-10 scale) post treatment: 3-4/10    ASSESSMENT/Changes in Function: pt. Is progressing slowly towards goals. He continues to have decreased L ankle mobility but is slowly improving.  He continues to have antalgic gait pattern    Patient will continue to benefit from skilled PT services to modify and progress therapeutic interventions, address functional mobility deficits, address ROM deficits, address strength deficits, analyze and address soft tissue restrictions, analyze and cue movement patterns, analyze and modify body mechanics/ergonomics and assess and modify postural abnormalities to attain remaining goals. Progress towards goals / Updated goals:  Short Term Goals: To be accomplished in 1 weeks:    1. Pt will demonstrate compliance with HEP in order to continue therapy at home. Met     Long Term Goals: To be accomplished in 4 weeks:    1. Pt will increase FOTO score by 24 points in order to demonstrate in increase in function.    2. Pt will ambulate with equal time on B LE with proper heel strike in order to return to work.    Not met. Pt cont to limp on the L. 17    3. Pt will increase L ankle plantar flexion to 4+/5 in order to improve walking mechanics. Met (17)    4. Pt will increase AROM of L ankle dorsiflexion to 5 degrees in order to improve heel strike during ambulation.   Progressin degrees (17)    PLAN  []  Upgrade activities as tolerated     [x]  Continue plan of care  []  Update interventions per flow sheet       []  Discharge due to:_  []  Other:_      Madhav Mcgill, PT 2017  10:09 AM

## 2017-09-12 ENCOUNTER — HOSPITAL ENCOUNTER (OUTPATIENT)
Dept: PHYSICAL THERAPY | Age: 52
Discharge: HOME | End: 2017-09-12
Payer: COMMERCIAL

## 2017-09-12 PROCEDURE — 97110 THERAPEUTIC EXERCISES: CPT

## 2017-09-12 PROCEDURE — 97140 MANUAL THERAPY 1/> REGIONS: CPT

## 2017-09-12 NOTE — PROGRESS NOTES
PT DAILY TREATMENT NOTE     Patient Name: Luther Flores  Date:2017  : 1965  [x]  Patient  Verified  Payor: BLUE CROSS / Plan: [facility] PPO / Product Type: PPO /    In time: 9:03  Out time: 9:37  Total Treatment Time (min): 34  Visit #: 3 of 8    Treatment Area: Pain of left heel [M79.672]  Plantar fasciitis of left foot [M72.2]    SUBJECTIVE  Pain Level (0-10 scale): 0/10  Any medication changes, allergies to medications, adverse drug reactions, diagnosis change, or new procedure performed?: [x] No    [] Yes (see summary sheet for update)  Subjective functional status/changes:   [] No changes reported  Pt reported feeling ok today    OBJECTIVE    26 min Therapeutic Exercise:  [x] See flow sheet :   Rationale: increase ROM and increase strength to improve the patients ability to increase ease of ADLs    8 min Manual Therapy:  Post TC mobs, DTM to plantar fascia with meathook   Rationale: decrease pain, increase ROM and increase tissue extensibility to increase ease of ADLs          With   [] TE   [] TA   [] neuro   [] other: Patient Education: [x] Review HEP    [] Progressed/Changed HEP based on:   [] positioning   [] body mechanics   [] transfers   [] heat/ice application    [] other:      Other Objective/Functional Measures:    Cont to be limited with L DF AROM, ?due to gastroc tightness? Min pain with DTM for L plantar fascitis   Min challenged with ecc heel lowering      Pain Level (0-10 scale) post treatment: 3/10 sore L sole    ASSESSMENT/Changes in Function: Pt making good progress with improved B feet strength but limited L ankle AROM. Will progress with more stretching Destiney and coordination, dynamic balance strengthening next visits.     Patient will continue to benefit from skilled PT services to modify and progress therapeutic interventions, address functional mobility deficits, address ROM deficits, address strength deficits, analyze and address soft tissue restrictions, analyze and cue movement patterns, analyze and modify body mechanics/ergonomics and assess and modify postural abnormalities to attain remaining goals.      Progress towards goals / Updated goals:  Short Term Goals: To be accomplished in 1 weeks:    1. Pt will demonstrate compliance with HEP in order to continue therapy at home. Met      Long Term Goals: To be accomplished in 4 weeks:    1. Pt will increase FOTO score by 24 points in order to demonstrate in increase in function.    2. Pt will ambulate with equal time on B LE with proper heel strike in order to return to work.    Not met. Pt cont to limp on the L. 17    3. Pt will increase L ankle plantar flexion to 4+/5 in order to improve walking mechanics. Met (17)    4. Pt will increase AROM of L ankle dorsiflexion to 5 degrees in order to improve heel strike during ambulation.   Progressin degrees (17)     PLAN  []  Upgrade activities as tolerated     [x]  Continue plan of care  []  Update interventions per flow sheet       []  Discharge due to:_  []  Other:_    Geni Nunez PT 2017  7:49 AM    Future Appointments  Date Time Provider Nate Ramachandran   2017 9:00 AM Alex JENSEN [facility]   2017 9:30 AM Radha Westbrook PTA [id number] [facility]   2017 9:30 AM Radha Westbrook PTA MMCPTPB [facility]   2017 10:30 AM Jewels Irwin PT [id number] [facility]   2017 9:30 AM Radha Westbrook PTA [id number] [facility]   2017 9:30 AM Radha Westbrook PTA [id number] [facility]

## 2017-09-14 ENCOUNTER — HOSPITAL ENCOUNTER (OUTPATIENT)
Dept: PHYSICAL THERAPY | Age: 52
Discharge: HOME | End: 2017-09-14
Payer: COMMERCIAL

## 2017-09-14 PROCEDURE — 97110 THERAPEUTIC EXERCISES: CPT

## 2017-09-14 NOTE — PROGRESS NOTES
PT DAILY TREATMENT NOTE     Patient Name: Ari Delacruz  Date:2017  : 1965  [x]  Patient  Verified  Payor: BLUE CROSS / Plan: [id number] PPO / Product Type: PPO /    In time:935  Out time:1002  Total Treatment Time (min): 27  Visit #: 4 of 8    Treatment Area: Pain of left heel [M79.672]  Plantar fasciitis of left foot [M72.2]    SUBJECTIVE  Pain Level (0-10 scale): 0/10  Any medication changes, allergies to medications, adverse drug reactions, diagnosis change, or new procedure performed?: [x] No    [] Yes (see summary sheet for update)  Subjective functional status/changes:   [] No changes reported  Pt stated that he is doing all right today    OBJECTIVE    27 min Therapeutic Exercise:  [x] See flow sheet :   Rationale: increase ROM and increase strength to improve the patients ability to increase ease with ADLs    With   [x] TE   [] TA   [] neuro   [] other: Patient Education: [x] Review HEP    [] Progressed/Changed HEP based on:   [] positioning   [] body mechanics   [] transfers   [] heat/ice application    [] other:      Other Objective/Functional Measures:   Pt had no complaint of increased pain with exercises  Needed cueing to slow down with eccentric heel lowers  Cont to be challenged with BAPS board     Pain Level (0-10 scale) post treatment: 0/10    ASSESSMENT/Changes in Function:   Pt cont to slowly progress toward goals. Pt cont to have slight limp. Has good ankle strength. Cont with decreased AROM in L ankle    Patient will continue to benefit from skilled PT services to modify and progress therapeutic interventions, address functional mobility deficits, address ROM deficits and address strength deficits to attain remaining goals.      []  See Plan of Care  [x]  See progress note/recertification  []  See Discharge Summary         Progress towards goals / Updated goals:  Short Term Goals: To be accomplished in 1 weeks:    1. Pt will demonstrate compliance with HEP in order to continue therapy at home. Met       Long Term Goals: To be accomplished in 4 weeks:    1. Pt will increase FOTO score by 24 points in order to demonstrate in increase in function.    2. Pt will ambulate with equal time on B LE with proper heel strike in order to return to work.    Not met. Pt cont to limp on the L. 17    3. Pt will increase L ankle plantar flexion to 4+/5 in order to improve walking mechanics. Met (17)    4. Pt will increase AROM of L ankle dorsiflexion to 5 degrees in order to improve heel strike during ambulation.   Progressin degrees (17)    PLAN  []  Upgrade activities as tolerated     [x]  Continue plan of care  []  Update interventions per flow sheet       []  Discharge due to:_  []  Other:_      Justin De La Fuente PTA 2017  9:37 AM    Future Appointments  Date Time Provider Nate Ramachandran   2017 9:30 AM Justin De La Fuente PTA [id number] [facility]   2017 10:30 AM Agusto Brown, PT AUSOWJS [facility]   2017 9:30 AM Justin De La Fuente PTA [id number] [facility]   2017 9:30 AM Justin De La Fuente PTA [id number] [facility]

## 2017-09-19 ENCOUNTER — HOSPITAL ENCOUNTER (OUTPATIENT)
Dept: PHYSICAL THERAPY | Age: 52
Discharge: HOME | End: 2017-09-19
Payer: COMMERCIAL

## 2017-09-19 PROCEDURE — 97110 THERAPEUTIC EXERCISES: CPT

## 2017-09-19 NOTE — PROGRESS NOTES
PT DAILY TREATMENT NOTE     Patient Name: Yadira Bryant  Date:2017  : 1965  [x]  Patient  Verified  Payor: BLUE CROSS / Plan: [facility] PPO / Product Type: PPO /    In time:930  Out time:954  Total Treatment Time (min): 24  Visit #: 5 of 8    Treatment Area: Pain of left heel [M79.672]  Plantar fasciitis of left foot [M72.2]    SUBJECTIVE  Pain Level (0-10 scale): 0/10  Any medication changes, allergies to medications, adverse drug reactions, diagnosis change, or new procedure performed?: [x] No    [] Yes (see summary sheet for update)  Subjective functional status/changes:   [] No changes reported  Pt requested that today be his last day as he is going back to work    OBJECTIVE    24 min Therapeutic Exercise:  [x] See flow sheet :   Rationale: increase ROM and increase strength to improve the patients ability to increase ease with ADLs    With   [x] TE   [] TA   [] neuro   [] other: Patient Education: [x] Review HEP    [] Progressed/Changed HEP based on:   [] positioning   [] body mechanics   [] transfers   [] heat/ice application    [] other:      Other Objective/Functional Measures:   Pt was issued final HEP  Pt showed understanding of all exercises     Pain Level (0-10 scale) post treatment: 0/10    ASSESSMENT/Changes in Function:   []  See Plan of Care  []  See progress note/recertification  [x]  See Discharge Summary         Progress towards goals / Updated goals:  Short Term Goals: To be accomplished in 1 weeks:    1. Pt will demonstrate compliance with HEP in order to continue therapy at home. Met       Long Term Goals: To be accomplished in 4 weeks:    1. Pt will increase FOTO score by 24 points in order to demonstrate in increase in function. Progressing. Increased from 31 to 45    2. Pt will ambulate with equal time on B LE with proper heel strike in order to return to work.    Goal met.  17    3. Pt will increase L ankle plantar flexion to 4+/5 in order to improve walking mechanics. Met (17)    4. Pt will increase AROM of L ankle dorsiflexion to 5 degrees in order to improve heel strike during ambulation.   Progressin degrees (17)    PLAN  []  Upgrade activities as tolerated     []  Continue plan of care  []  Update interventions per flow sheet       [x]  Discharge due to:_  []  Other:_      Edy Howell PTA 2017  9:28 AM    Future Appointments  Date Time Provider Nate Ramachandran   2017 9:30 AM Edy Howell PTA [id number] [facility]   2017 10:30 AM Joi Mcgrath, PT [id number] [facility]   2017 9:30 AM Edy Howell PTA [id number] [facility]   2017 9:30 AM Edy Howell PTA [id number] [facility]

## 2017-09-21 ENCOUNTER — APPOINTMENT (OUTPATIENT)
Dept: PHYSICAL THERAPY | Age: 52
End: 2017-09-21
Payer: COMMERCIAL

## 2017-09-22 NOTE — PROGRESS NOTES
[facility] - [address]  [phone] fax    Physical Therapy Discharge Summary    Patient name: Marylee Sell Start of Care:  17   Referral source: Andrea Zhang DPM : 1965   Medical/Treatment Diagnosis: Pain of left heel [M79.672]  Plantar fasciitis of left foot [M72.2] Onset Date: 2016   Prior Hospitalization: see medical history Provider#: [id number]   Medications: Verified on Patient Summary List     Comorbidities:  HTN, asthma  Prior Level of Function Ind with ADLs/IADLs, plays softball, playing with grandchildren  Visits from Start of Care: 13                                                                           Missed Visits: 0      Reporting Period : 2017 to 2017    Summary of Care:  Goal: Pt will demonstrate compliance with HEP in order to continue therapy at home. Status at last note/certification: met  Status at discharge: met    Goal: Pt will increase FOTO score by 24 points in order to demonstrate in increase in function. Status at last note/certification: Progressing. Increased from 31 to 45  Status at discharge: not met    Goal: Pt will ambulate with equal time on B LE with proper heel strike in order to return to work.    Status at last note/certification: Goal met. 17  Status at discharge: met    Goal: Pt will increase L ankle plantar flexion to 4+/5 in order to improve walking mechanics. Status at last note/certification: Met ()  Status at discharge: met    Goal: Pt will increase AROM of L ankle dorsiflexion to 5 degrees in order to improve heel strike during ambulation. Status at last note/certification:Progressin degrees (17)  Status at discharge: not met    ASSESSMENT/RECOMMENDATIONS: pt progress steady towards goals with improved L ankle strength, gait pattern and significant pain relief during the last 3 visits.  Pt cont to be limited with decreased L ankle DF AROM but pt reported he was doing ok and would like to be discharged today. Pt demonstrated good understanding/performance of updated HEP to cont working on L ankle ROM and B LEs strength.     [x]Discontinue therapy: [x]Patient has reached or is progressing toward set goals and pt's request      []Patient is non-compliant or has abdicated      []Due to lack of appreciable progress towards set goals    Bhakti Ricardo, PT 9/22/2017 2:43 PM

## 2017-09-26 ENCOUNTER — APPOINTMENT (OUTPATIENT)
Dept: PHYSICAL THERAPY | Age: 52
End: 2017-09-26
Payer: COMMERCIAL

## 2017-09-28 ENCOUNTER — APPOINTMENT (OUTPATIENT)
Dept: PHYSICAL THERAPY | Age: 52
End: 2017-09-28
Payer: COMMERCIAL

## 2018-11-14 ENCOUNTER — HOSPITAL ENCOUNTER (OUTPATIENT)
Dept: LAB | Age: 53
Discharge: HOME | End: 2018-11-14
Payer: COMMERCIAL

## 2018-11-14 LAB
25(OH)D3 SERPL-MCNC: 35.5 NG/ML (ref 30–100)
ALBUMIN SERPL-MCNC: 4.2 G/DL (ref 3.4–5)
ALBUMIN/GLOB SERPL: 1.1 {RATIO} (ref 0.8–1.7)
ALP SERPL-CCNC: 50 U/L (ref 45–117)
ALT SERPL-CCNC: 37 U/L (ref 16–61)
AMPHET UR QL SCN: NEGATIVE
ANION GAP SERPL CALC-SCNC: 11 MMOL/L (ref 3–18)
AST SERPL-CCNC: 26 U/L (ref 15–37)
BARBITURATES UR QL SCN: NEGATIVE
BENZODIAZ UR QL: NEGATIVE
BILIRUB SERPL-MCNC: 0.3 MG/DL (ref 0.2–1)
BUN SERPL-MCNC: 11 MG/DL (ref 7–18)
BUN/CREAT SERPL: 12 (ref 12–20)
CALCIUM SERPL-MCNC: 8.6 MG/DL (ref 8.5–10.1)
CANNABINOIDS UR QL SCN: POSITIVE
CHLORIDE SERPL-SCNC: 101 MMOL/L (ref 100–108)
CHOLEST SERPL-MCNC: 173 MG/DL
CO2 SERPL-SCNC: 28 MMOL/L (ref 21–32)
COCAINE UR QL SCN: NEGATIVE
CREAT SERPL-MCNC: 0.93 MG/DL (ref 0.6–1.3)
ERYTHROCYTE [DISTWIDTH] IN BLOOD BY AUTOMATED COUNT: 12.7 % (ref 11.6–14.5)
GLOBULIN SER CALC-MCNC: 3.7 G/DL (ref 2–4)
GLUCOSE SERPL-MCNC: 135 MG/DL (ref 74–99)
HCT VFR BLD AUTO: 41.6 % (ref 36–48)
HDLC SERPL-MCNC: 39 MG/DL (ref 40–60)
HDLC SERPL: 4.4 {RATIO} (ref 0–5)
HDSCOM,HDSCOM: ABNORMAL
HGB BLD-MCNC: 14.2 G/DL (ref 13–16)
LDLC SERPL CALC-MCNC: 59 MG/DL (ref 0–100)
LIPID PROFILE,FLP: ABNORMAL
MCH RBC QN AUTO: 31.7 PG (ref 24–34)
MCHC RBC AUTO-ENTMCNC: 34.1 G/DL (ref 31–37)
MCV RBC AUTO: 92.9 FL (ref 74–97)
METHADONE UR QL: NEGATIVE
OPIATES UR QL: POSITIVE
PCP UR QL: NEGATIVE
PLATELET # BLD AUTO: 269 K/UL (ref 135–420)
PMV BLD AUTO: 9.6 FL (ref 9.2–11.8)
POTASSIUM SERPL-SCNC: 3.6 MMOL/L (ref 3.5–5.5)
PROT SERPL-MCNC: 7.9 G/DL (ref 6.4–8.2)
PSA SERPL-MCNC: 1 NG/ML (ref 0–4)
RBC # BLD AUTO: 4.48 M/UL (ref 4.7–5.5)
SODIUM SERPL-SCNC: 140 MMOL/L (ref 136–145)
TRIGL SERPL-MCNC: 375 MG/DL (ref ?–150)
VLDLC SERPL CALC-MCNC: 75 MG/DL
WBC # BLD AUTO: 8.3 K/UL (ref 4.6–13.2)

## 2018-11-14 PROCEDURE — 80061 LIPID PANEL: CPT

## 2018-11-14 PROCEDURE — 85027 COMPLETE CBC AUTOMATED: CPT

## 2018-11-14 PROCEDURE — 80053 COMPREHEN METABOLIC PANEL: CPT

## 2018-11-14 PROCEDURE — 82306 VITAMIN D 25 HYDROXY: CPT

## 2018-11-14 PROCEDURE — 84153 ASSAY OF PSA TOTAL: CPT

## 2018-11-14 PROCEDURE — 36415 COLL VENOUS BLD VENIPUNCTURE: CPT

## 2018-11-14 PROCEDURE — 80307 DRUG TEST PRSMV CHEM ANLYZR: CPT

## 2020-02-14 PROBLEM — R07.9 CHEST PAIN: Status: ACTIVE | Noted: 2020-02-14

## 2022-01-18 ENCOUNTER — OFFICE VISIT (OUTPATIENT)
Dept: ORTHOPEDIC SURGERY | Age: 57
End: 2022-01-18
Payer: COMMERCIAL

## 2022-01-18 VITALS
BODY MASS INDEX: 28.49 KG/M2 | OXYGEN SATURATION: 99 % | TEMPERATURE: 97.8 F | HEIGHT: 73 IN | HEART RATE: 89 BPM | WEIGHT: 215 LBS

## 2022-01-18 DIAGNOSIS — M25.511 CHRONIC RIGHT SHOULDER PAIN: Primary | ICD-10-CM

## 2022-01-18 DIAGNOSIS — G89.29 CHRONIC RIGHT SHOULDER PAIN: Primary | ICD-10-CM

## 2022-01-18 DIAGNOSIS — M75.101 TEAR OF RIGHT ROTATOR CUFF, UNSPECIFIED TEAR EXTENT, UNSPECIFIED WHETHER TRAUMATIC: ICD-10-CM

## 2022-01-18 PROCEDURE — 99203 OFFICE O/P NEW LOW 30 MIN: CPT | Performed by: ORTHOPAEDIC SURGERY

## 2022-01-18 PROCEDURE — 73030 X-RAY EXAM OF SHOULDER: CPT | Performed by: ORTHOPAEDIC SURGERY

## 2022-01-18 RX ORDER — MELOXICAM 15 MG/1
15 TABLET ORAL DAILY
Qty: 30 TABLET | Refills: 1 | Status: SHIPPED | OUTPATIENT
Start: 2022-01-18

## 2022-01-18 NOTE — PROGRESS NOTES
Manuela Ramey  1965   Chief Complaint   Patient presents with    Shoulder Pain     right shoulder        HISTORY OF PRESENT ILLNESS  Manuela Ramey is a 55 y.o. male who presents today for evaluation of right shoulder. He rates his pain 10/10 today. Pain has been present since June 2021 when he was attacked my a dog. He fell on the shoulder twice during this incident. He has limited ROM with night pain. He states the pain is located above the elbow. Patient describes the pain as aching, sharp and throbbing that is Constant in nature. Symptoms are worse with bending, stretching and straightening and is better with  nothing. Associated symptoms include nothing. Since problem started, it: has worsened. Pain does wake patient up at night. Has taken OTC NSAIDs for the problem. Has tried following treatments: Injections:NO; Brace:NO; Therapy:NO; Cane/Crutch:NO       Allergies   Allergen Reactions    Penicillins Hives        Past Medical History:   Diagnosis Date    Asthma     Hypertension     Other ill-defined conditions(869.48)     Chronic back pain      Social History     Socioeconomic History    Marital status:      Spouse name: Not on file    Number of children: Not on file    Years of education: Not on file    Highest education level: Not on file   Occupational History    Not on file   Tobacco Use    Smoking status: Current Every Day Smoker     Packs/day: 0.50     Years: 20.00     Pack years: 10.00     Types: Cigarettes    Smokeless tobacco: Never Used   Substance and Sexual Activity    Alcohol use:  Yes     Alcohol/week: 0.0 standard drinks     Comment: occaisonally    Drug use: No    Sexual activity: Not on file   Other Topics Concern    Not on file   Social History Narrative    Not on file     Social Determinants of Health     Financial Resource Strain:     Difficulty of Paying Living Expenses: Not on file   Food Insecurity:     Worried About [facility] in the Last Year: Not on file    Ran Out of Food in the Last Year: Not on file   Transportation Needs:     Lack of Transportation (Medical): Not on file    Lack of Transportation (Non-Medical): Not on file   Physical Activity:     Days of Exercise per Week: Not on file    Minutes of Exercise per Session: Not on file   Stress:     Feeling of Stress : Not on file   Social Connections:     Frequency of Communication with Friends and Family: Not on file    Frequency of Social Gatherings with Friends and Family: Not on file    Attends Catholic Services: Not on file    Active Member of [address] [place] [address] [employer] or Organizations: Not on file    Attends Club or Organization Meetings: Not on file    Marital Status: Not on file   Intimate Partner Violence:     Fear of Current or Ex-Partner: Not on file    Emotionally Abused: Not on file    Physically Abused: Not on file    Sexually Abused: Not on file   Housing Stability:     Unable to Pay for Housing in the Last Year: Not on file    Number of [place] in the Last Year: Not on file    Unstable Housing in the Last Year: Not on file      Past Surgical History:   Procedure Laterality Date    HX HERNIA REPAIR      groin    HX ORTHOPAEDIC      Lt shoulder    HX ORTHOPAEDIC      Rt knee    HX WISDOM TEETH EXTRACTION        Family History   Problem Relation Age of Onset    Hypertension Mother     Cancer Father         colon cancer    Diabetes Neg Hx     Heart Disease Neg Hx     Stroke Neg Hx       Current Outpatient Medications   Medication Sig    aspirin 81 mg chewable tablet Take 1 Tab by mouth daily.  polyethylene glycol (MIRALAX) 17 gram packet Take 1 Packet by mouth daily as needed for Constipation.  metFORMIN (GLUCOPHAGE) 500 mg tablet Take 1 Tab by mouth two (2) times daily (with meals).  amLODIPine-benazepril (LOTREL) 10-40 mg per capsule Take 1 Cap by mouth daily.  rosuvastatin (CRESTOR) 10 mg tablet Take 10 mg by mouth nightly.     oxyCODONE-acetaminophen (PERCOCET 10)  mg per tablet Take 1 Tab by mouth every four (4) hours as needed for Pain.  traMADol (ULTRAM) 50 mg tablet Take 100 mg by mouth daily.  albuterol (PROAIR HFA) 90 mcg/actuation inhaler Take 2 Puffs by inhalation every four (4) hours as needed for Wheezing.  montelukast (SINGULAIR) 10 mg tablet Take 1 Tab by mouth daily. (Patient taking differently: Take 10 mg by mouth daily as needed.)     No current facility-administered medications for this visit. REVIEW OF SYSTEM   Patient denies: Weight loss, Fever/Chills, HA, Visual changes, Fatigue, Chest pain, SOB, Abdominal pain, N/V/D/C, Blood in stool or urine, Edema. Pertinent positive as above in HPI. All others were negative    PHYSICAL EXAM:   Visit Vitals  Pulse 89   Temp 97.8 °F (36.6 °C) (Temporal)   Ht 6' 1\" (1.854 m)   Wt 215 lb (97.5 kg)   SpO2 99%   BMI 28.37 kg/m²     The patient is a well-developed, well-nourished male   in no acute distress. The patient is alert and oriented times three. The patient is alert and oriented times three. Mood and affect are normal.  LYMPHATIC: lymph nodes are not enlarged and are within normal limits  SKIN: normal in color and non tender to palpation. There are no bruises or abrasions noted. NEUROLOGICAL: Motor sensory exam is within normal limits. Reflexes are equal bilaterally.  There is normal sensation to pinprick and light touch  MUSCULOSKELETAL:  Examination Right shoulder   Skin Intact   AC joint tenderness -   Biceps tenderness -   Forward flexion/Elevation    Active abduction    Glenohumeral abduction 90   External rotation ROM 45   Internal rotation ROM 30   Apprehension -   Lindas Relocation -   Jerk -   Load and Shift -   Obriens -   Speeds -   Impingement sign +   Supraspinatus/Empty Can +   External Rotation Strength -, 5/5   Lift Off/Belly Press -, 5/5   Neurovascular Intact        PROCEDURE: none    IMAGING: XR of the right shoulder with 3 views obtained in the office dated 1/18/2022 was reviewed and read by Dr. Luo Arms: Sclerotic changes in the greater tuberosity. IMPRESSION:      ICD-10-CM ICD-9-CM    1. Chronic right shoulder pain  M25.511 719.41 AMB POC XRAY, SHOULDER; COMPLETE, 2+    G89.29 338.29    2. Tear of right rotator cuff, unspecified tear extent, unspecified whether traumatic  M75.101 840.4         PLAN:  1. Patient presents today with right shoulder pain due to a possible RCT. Due to his night pain, limited ROM and recurrent nature, we will order la STAT MRI and start on mobic to help with inflammation. Return following MRI   Risk factors include: htn  2. No ultrasound exam indicated today  3. No cortisone injection indicated today   4. No Physical/Occupational Therapy indicated today  5. Yes diagnostic test indicated today: R SHOULDER STAT MRI   6. No durable medical equipment indicated today  7. No referral indicated today   8. Yes medications indicated today: MOBIC  9. No Narcotic indicated today       RTC following STAT MRI       Scribed by Mendez Sam [facility]) as dictated by Jeannie Shafer MD    I, Dr. Jeannie Shafer, confirm that all documentation is accurate.     Jeannie Shafer M.D.   [facility]

## 2022-01-28 PROBLEM — R77.8 ELEVATED TROPONIN: Status: ACTIVE | Noted: 2022-01-28

## 2022-01-28 PROBLEM — E87.6 HYPOKALEMIA: Status: ACTIVE | Noted: 2022-01-28

## 2022-01-28 PROBLEM — Z72.0 TOBACCO ABUSE: Status: ACTIVE | Noted: 2022-01-28

## 2022-01-28 PROBLEM — R94.31 INVERTED T WAVE: Status: ACTIVE | Noted: 2022-01-28

## 2022-01-28 PROBLEM — R94.31 QT PROLONGATION: Status: ACTIVE | Noted: 2022-01-28

## 2022-01-28 PROBLEM — R51.9 ACUTE HEADACHE: Status: ACTIVE | Noted: 2022-01-28

## 2022-02-06 ENCOUNTER — HOSPITAL ENCOUNTER (OUTPATIENT)
Age: 57
Discharge: HOME | End: 2022-02-06
Attending: ORTHOPAEDIC SURGERY
Payer: COMMERCIAL

## 2022-02-06 DIAGNOSIS — M75.101 TEAR OF RIGHT ROTATOR CUFF, UNSPECIFIED TEAR EXTENT, UNSPECIFIED WHETHER TRAUMATIC: ICD-10-CM

## 2022-02-06 PROCEDURE — 73221 MRI JOINT UPR EXTREM W/O DYE: CPT

## 2022-03-18 PROBLEM — Z72.0 TOBACCO ABUSE: Status: ACTIVE | Noted: 2022-01-28

## 2022-03-18 PROBLEM — R51.9 ACUTE HEADACHE: Status: ACTIVE | Noted: 2022-01-28

## 2022-03-18 PROBLEM — R07.9 CHEST PAIN: Status: ACTIVE | Noted: 2020-02-14

## 2022-03-19 PROBLEM — R77.8 ELEVATED TROPONIN: Status: ACTIVE | Noted: 2022-01-28

## 2022-03-19 PROBLEM — R79.89 ELEVATED TROPONIN: Status: ACTIVE | Noted: 2022-01-28

## 2022-03-19 PROBLEM — R94.31 QT PROLONGATION: Status: ACTIVE | Noted: 2022-01-28

## 2022-03-19 PROBLEM — R94.31 INVERTED T WAVE: Status: ACTIVE | Noted: 2022-01-28

## 2022-03-19 PROBLEM — E87.6 HYPOKALEMIA: Status: ACTIVE | Noted: 2022-01-28

## 2023-01-03 ENCOUNTER — OFFICE VISIT (OUTPATIENT)
Dept: ORTHOPEDIC SURGERY | Age: 58
End: 2023-01-03
Payer: COMMERCIAL

## 2023-01-03 VITALS
RESPIRATION RATE: 18 BRPM | WEIGHT: 195.4 LBS | HEART RATE: 90 BPM | OXYGEN SATURATION: 96 % | TEMPERATURE: 97.8 F | BODY MASS INDEX: 25.9 KG/M2 | DIASTOLIC BLOOD PRESSURE: 82 MMHG | HEIGHT: 73 IN | SYSTOLIC BLOOD PRESSURE: 135 MMHG

## 2023-01-03 DIAGNOSIS — M51.26 HNP (HERNIATED NUCLEUS PULPOSUS), LUMBAR: Primary | ICD-10-CM

## 2023-01-03 DIAGNOSIS — M21.372 FOOT DROP, LEFT FOOT: ICD-10-CM

## 2023-01-03 PROCEDURE — 99204 OFFICE O/P NEW MOD 45 MIN: CPT | Performed by: PHYSICAL MEDICINE & REHABILITATION

## 2023-01-03 RX ORDER — METHYLPREDNISOLONE 4 MG/1
TABLET ORAL
Qty: 1 DOSE PACK | Refills: 0 | Status: SHIPPED | OUTPATIENT
Start: 2023-01-03

## 2023-01-03 RX ORDER — GABAPENTIN 300 MG/1
300 CAPSULE ORAL 3 TIMES DAILY
Qty: 90 CAPSULE | Refills: 1 | Status: SHIPPED | OUTPATIENT
Start: 2023-01-03

## 2023-01-03 RX ORDER — POTASSIUM CHLORIDE 1500 MG/1
TABLET, FILM COATED, EXTENDED RELEASE ORAL
COMMUNITY
Start: 2022-12-19

## 2023-01-03 RX ORDER — CLINDAMYCIN HYDROCHLORIDE 300 MG/1
CAPSULE ORAL
COMMUNITY
Start: 2022-12-06 | End: 2023-01-03

## 2023-01-03 NOTE — PROGRESS NOTES
Vivienne Escamilla presents today for   Chief Complaint   Patient presents with    Back Pain       Is someone accompanying this pt? no    Is the patient using any DME equipment during OV? Yes cane    Depression Screening:  3 most recent PHQ Screens 1/18/2022   PHQ Not Done Patient refuses       Learning Assessment:  Learning Assessment 8/9/2016   PRIMARY LEARNER Patient   PRIMARY LANGUAGE ENGLISH   LEARNER PREFERENCE PRIMARY DEMONSTRATION     LISTENING     READING     PICTURES   ANSWERED BY Patient   RELATIONSHIP SELF       Abuse Screening:  No flowsheet data found. Fall Risk  No flowsheet data found. OPIOID RISK TOOL  No flowsheet data found. Coordination of Care:  1. Have you been to the ER, urgent care clinic since your last visit? no  Hospitalized since your last visit? no    2. Have you seen or consulted any other health care providers outside of the [address] [place] [address] since your last visit? no Include any pap smears or colon screening.  no

## 2023-01-03 NOTE — LETTER
NOTIFICATION RETURN TO WORK / SCHOOL    1/3/2023 2:54 PM    Mr. Efrain Boxer  [address]  [address] [place]      To Whom It May Concern:    Efrain Boxer is currently under the care of [facility]. He will return to work with the following restriction:    No lifting greater than 10 pounds  No repetitive bending/twisting at the waist    Work day restricted to 8 hours. If there are questions or concerns please have the patient contact our office.         Sincerely,          Uriel Giron MD

## 2023-01-04 NOTE — PROGRESS NOTES
[facility] [address]  [address], [address] [facility],[address]  [address] [facility]  Phone: [phone]  Fax: [phone]  : 1965  PCP: Lilia Pearson MD    NEW PATIENT EVALUATION      ASSESSMENT AND PLAN    Diagnoses and all orders for this visit:    1. HNP (herniated nucleus pulposus), lumbar  -     SCHEDULE SURGERY  -     methylPREDNISolone (MEDROL DOSEPACK) 4 mg tablet; Per dose pack instructions  -     gabapentin (NEURONTIN) 300 mg capsule; Take 1 Capsule by mouth three (3) times daily. Max Daily Amount: 900 mg.    2. Foot drop, left foot         Shanae Buckley is a 65 y.o. male  presenting with hx of acute onset left L5/S1 radiculopathy 22 WRI. Radicular pain has improved w/PT, however, he has ongoing left sided LBP, nerve tension signs, and left DF weakness consistent with radiculopathy. Will contact [facility] radiology regarding MRI. MDP, denies prior corticosteroids since onset of symptoms. Initiate Gabapentin  SNRB left L5  Cont HEP. Poss work reconditioning after spine injection. Given work restrictions  Discussed sx eval for progressive weakness or intractable pain         HISTORY OF PRESENT ILLNESS  Shanae Buckley is seen today in consultation for left LBP. Referred by Dr. Farheen Richmond.   Pain Assessment  1/3/2023   Location of Pain Back   Location Modifiers -   Severity of Pain 6   Quality of Pain Dull;Aching   Duration of Pain Persistent   Frequency of Pain Constant   Date Pain First Started -   Date Pain First Started Comment -   Aggravating Factors Bending   Limiting Behavior Yes   Relieving Factors Heat;Other (Comment)   Relieving Factors Comment wedge pillow   Result of Injury Yes   Work-Related Injury Yes   How long out of work? aug 4   Type of Injury Other (Comment)   Type of Injury Comment bending to get something on the bottom of machine felt a pop bad pain     Patient employed as  x15 years. During course of usual duties on 8/4/22, while bending to inspect a shipment, pt felt a pop in his LB and had severe shooting pain into his left buttocks, posterior thigh, calf, to ankle. Seen by [facility] doctor. Notes not available for review. Has been on Tramadol since August, 2022. Initially on Percocet. Mobic no benefit. Reports improvement w/PT, was discontinued when MRI done. Had MRI 9/2022, images reviewed w/patient. Patient reports that he is better but not back to baseline. Has not been back to work since 10/13/22 due to pain. Normally works 12hour days. Limited standing and walking due to pain. Reports spasms when trying to bend forwards or lift. Feels better w/sitting and rest.    Denies persistent fevers, chills, weight changes, saddle paresthesias. , Denies neurogenic bladder symptoms. Reports intermittent bowel leakage since injury. Denies prior hx of lumbar pain requiring PT,chiro, medication, injection. Onset: 8/4/22 bending at work, felt pop and severe shooting LLE pain    Investigations:   Lumbar MRI 9/2022 [facility]: My review of images: bulges L3/4, L4/5, L5/S1. left L5 protrusion. Official report to follow. Spine surgery consult: No    Treatments:  Physical therapy: Yes with benefit September 2022 discontinued after MRI  Spinal injections: None  Spinal surgery-none  Beneficial medications: Percocet, tramadol  Failed medications: Mobic    Work Status:  x 15 yrs. Last worked 10/13/22. Trying to get STD  Pertinent PMHx:  asthma, htn, heel spurs, left shoulder sx, right knee sx. Physical Exam:   Visit Vitals  /82 (BP 1 Location: Right arm, BP Patient Position: Sitting, BP Cuff Size: Adult)   Pulse 90   Temp 97.8 °F (36.6 °C) (Temporal)   Resp 18   Ht 6' 1\" (1.854 m)   Wt 195 lb 6.4 oz (88.6 kg)   SpO2 96% Comment: RA   BMI 25.78 kg/m²     Well-developed well-nourished gentleman ambulating with a single-point cane. No foot slap.   He is tender at his bilateral lumbar paraspinals. He has focal tenderness on the left at L4-L5 and L5-S1. Mild tenderness at the left sciatic notch. Lower extremity strength is intact for his hip flexors, hamstrings, and quads. He has 4 out of 5 weakness of his left dorsiflexors. Straight leg raise is positive on the left at 90 degrees. Negative on the right side. No peripheral edema is noted. No clonus. Past Medical History:   Diagnosis Date    Asthma     Hypertension     Other ill-defined conditions(799.89)     Chronic back pain        Past Surgical History:   Procedure Laterality Date    HX HERNIA REPAIR      groin    HX ORTHOPAEDIC      Lt shoulder    HX ORTHOPAEDIC      Rt knee    HX WISDOM TEETH EXTRACTION           Current Outpatient Medications   Medication Sig Dispense Refill    potassium chloride SR (K-TAB) 20 mEq tablet TAKE 1 TABLET BY MOUTH 3 TIMES A DAY FOR 1 WEEK, THEN TAKE ONE TABLET DAILY      methylPREDNISolone (MEDROL DOSEPACK) 4 mg tablet Per dose pack instructions 1 Dose Pack 0    gabapentin (NEURONTIN) 300 mg capsule Take 1 Capsule by mouth three (3) times daily. Max Daily Amount: 900 mg. 90 Capsule 1    spironolactone (ALDACTONE) 25 mg tablet Take 1 Tablet by mouth daily. 30 Tablet 1    ergocalciferol (ERGOCALCIFEROL) 1,250 mcg (50,000 unit) capsule Take 50,000 Units by mouth every seven (7) days. amLODIPine-benazepril (LOTREL) 10-40 mg per capsule Take 1 Cap by mouth daily. rosuvastatin (CRESTOR) 10 mg tablet Take 10 mg by mouth nightly. oxyCODONE-acetaminophen (PERCOCET 10)  mg per tablet Take 1 Tablet by mouth every four (4) hours as needed for Pain.      traMADol (ULTRAM) 50 mg tablet Take 100 mg by mouth daily. albuterol (PROVENTIL HFA, VENTOLIN HFA, PROAIR HFA) 90 mcg/actuation inhaler Take 2 Puffs by inhalation every four (4) hours as needed for Wheezing. Mr. Mecca Padilla may have a reminder for a \"due or due soon\" health maintenance.  I have asked that he contact his primary care provider for follow-up on this health maintenance. This note was created using Dragon transcription software and may contain unintended errors.

## 2023-02-20 ENCOUNTER — OFFICE VISIT (OUTPATIENT)
Age: 58
End: 2023-02-20

## 2023-02-20 VITALS — OXYGEN SATURATION: 98 % | HEART RATE: 111 BPM | BODY MASS INDEX: 25.78 KG/M2 | HEIGHT: 73 IN

## 2023-02-20 DIAGNOSIS — M51.26 OTHER INTERVERTEBRAL DISC DISPLACEMENT, LUMBAR REGION: ICD-10-CM

## 2023-02-20 DIAGNOSIS — M54.9 TRIGGER POINT WITH BACK PAIN: Primary | ICD-10-CM

## 2023-02-20 DIAGNOSIS — M51.26 PROTRUDED LUMBAR DISC: ICD-10-CM

## 2023-02-20 RX ORDER — DAPAGLIFLOZIN 10 MG/1
TABLET, FILM COATED ORAL
COMMUNITY
Start: 2023-01-17

## 2023-02-20 RX ORDER — POTASSIUM CHLORIDE 750 MG/1
TABLET, FILM COATED, EXTENDED RELEASE ORAL
COMMUNITY
Start: 2023-02-17

## 2023-02-20 RX ORDER — TRAMADOL HYDROCHLORIDE 50 MG/1
TABLET ORAL
COMMUNITY
Start: 2023-01-17

## 2023-02-20 RX ORDER — BISOPROLOL FUMARATE AND HYDROCHLOROTHIAZIDE 5; 6.25 MG/1; MG/1
TABLET ORAL
COMMUNITY
Start: 2023-01-17

## 2023-02-20 RX ORDER — ERGOCALCIFEROL 1.25 MG/1
50000 CAPSULE ORAL
COMMUNITY

## 2023-02-20 RX ORDER — GABAPENTIN 300 MG/1
1 CAPSULE ORAL
COMMUNITY
Start: 2023-01-03

## 2023-02-20 RX ORDER — OXYCODONE AND ACETAMINOPHEN 10; 325 MG/1; MG/1
TABLET ORAL
COMMUNITY
Start: 2022-12-14

## 2023-02-20 RX ORDER — BETAMETHASONE SODIUM PHOSPHATE AND BETAMETHASONE ACETATE 3; 3 MG/ML; MG/ML
12 INJECTION, SUSPENSION INTRA-ARTICULAR; INTRALESIONAL; INTRAMUSCULAR; SOFT TISSUE ONCE
Status: COMPLETED | OUTPATIENT
Start: 2023-02-20 | End: 2023-02-20

## 2023-02-20 RX ORDER — BUPIVACAINE HYDROCHLORIDE 2.5 MG/ML
1 INJECTION, SOLUTION INFILTRATION; PERINEURAL ONCE
Status: COMPLETED | OUTPATIENT
Start: 2023-02-20 | End: 2023-02-20

## 2023-02-20 RX ORDER — ROSUVASTATIN CALCIUM 10 MG/1
TABLET, COATED ORAL
COMMUNITY
Start: 2023-01-17

## 2023-02-20 RX ORDER — ALBUTEROL SULFATE 90 UG/1
2 AEROSOL, METERED RESPIRATORY (INHALATION) EVERY 4 HOURS PRN
COMMUNITY

## 2023-02-20 RX ORDER — AMLODIPINE BESYLATE AND BENAZEPRIL HYDROCHLORIDE 10; 40 MG/1; MG/1
CAPSULE ORAL
COMMUNITY
Start: 2023-01-17

## 2023-02-20 RX ADMIN — BETAMETHASONE SODIUM PHOSPHATE AND BETAMETHASONE ACETATE 12 MG: 3; 3 INJECTION, SUSPENSION INTRA-ARTICULAR; INTRALESIONAL; INTRAMUSCULAR; SOFT TISSUE at 16:34

## 2023-02-20 RX ADMIN — BUPIVACAINE HYDROCHLORIDE 2.5 MG: 2.5 INJECTION, SOLUTION INFILTRATION; PERINEURAL at 16:35

## 2023-02-20 ASSESSMENT — PATIENT HEALTH QUESTIONNAIRE - PHQ9
SUM OF ALL RESPONSES TO PHQ QUESTIONS 1-9: 17
3. TROUBLE FALLING OR STAYING ASLEEP: 3
5. POOR APPETITE OR OVEREATING: 3
1. LITTLE INTEREST OR PLEASURE IN DOING THINGS: 3
4. FEELING TIRED OR HAVING LITTLE ENERGY: 2
9. THOUGHTS THAT YOU WOULD BE BETTER OFF DEAD, OR OF HURTING YOURSELF: 0
8. MOVING OR SPEAKING SO SLOWLY THAT OTHER PEOPLE COULD HAVE NOTICED. OR THE OPPOSITE, BEING SO FIGETY OR RESTLESS THAT YOU HAVE BEEN MOVING AROUND A LOT MORE THAN USUAL: 0
2. FEELING DOWN, DEPRESSED OR HOPELESS: 3
SUM OF ALL RESPONSES TO PHQ9 QUESTIONS 1 & 2: 6
SUM OF ALL RESPONSES TO PHQ QUESTIONS 1-9: 17
7. TROUBLE CONCENTRATING ON THINGS, SUCH AS READING THE NEWSPAPER OR WATCHING TELEVISION: 0
SUM OF ALL RESPONSES TO PHQ QUESTIONS 1-9: 17
6. FEELING BAD ABOUT YOURSELF - OR THAT YOU ARE A FAILURE OR HAVE LET YOURSELF OR YOUR FAMILY DOWN: 3
SUM OF ALL RESPONSES TO PHQ QUESTIONS 1-9: 17
10. IF YOU CHECKED OFF ANY PROBLEMS, HOW DIFFICULT HAVE THESE PROBLEMS MADE IT FOR YOU TO DO YOUR WORK, TAKE CARE OF THINGS AT HOME, OR GET ALONG WITH OTHER PEOPLE: 2

## 2023-02-20 NOTE — PROGRESS NOTES
[address] [facility], [address]  [place] [address]  Phone: [phone]  Fax: [phone]        Bryon Mata  : 1965  PCP: Jael Arroyo MD    PROGRESS NOTE      ASSESSMENT AND PLAN    Bryon was seen today for back pain.    Diagnoses and all orders for this visit:    Trigger point with back pain  -     INJECT TRIGGER POINT, 1 OR 2; Future    Other intervertebral disc displacement, lumbar region  -     betamethasone acetate-betamethasone sodium phosphate (CELESTONE) injection 12 mg  -     bupivacaine (MARCAINE) 0.25 % injection 2.5 mg    Protruded lumbar disc      Bryon Mata is a 57 y.o. male  with a work-related injury 2022.  He continues to improve.  No nerve tension signs or radiculopathy on exam today.  He continues to have mechanical low back pain.    Patient given work note for light duty  Recommend work reconditioning, advance to full duty..  Avoid lifting objects greater than 20 pounds.  Trigger point injection left iliolumbar   Change to routine Gabapentin QHS, unable to tolerate during the day.  Arthritis strength Tylenol 650 mg 3 times daily as needed pain  No indication for surgery or fluoroscopy guided spine injection at this time.    Follow-up and Dispositions    Return in about 4 weeks (around 3/20/2023) for medication management.           HISTORY OF PRESENT ILLNESS      Bryon Mata is a 57 y.o. male presents for follow up of back pain. LV scheduled left L5 SNRB, MDP, started Gabapentin 300 mg TID.     Reports feeling better with the MDP.  Pt reports  low back pain and intermittent sciatica, sometimes in his left calf. He notes intermittent sharp left LB pains when transitioning from sit to stand. Pt sleeps on a wedge for comfort.    He is stressed and upset as he has not been paid since October.  They will not take him back on light duty.  This is caused friction with him and his spouse as well.    He takes Gabapentin 300 mg  QHS. He is unable to tolerate the daytime dose because he feels out of it. He states the MDP was helpful. Denies side effects. Pt is unable to take OTC NSAIDs because it made his stools black. AMB PAIN ASSESSMENT 2/20/2023   Location of Pain Back   Location Modifiers Other (Comment)   Severity of Pain 5   Quality of Pain Dull; Other (Comment); Sharp   Duration of Pain Persistent   Frequency of Pain Constant   Aggravating Factors Other (Comment)   Limiting Behavior Yes   Relieving Factors Other (Comment)   Result of Injury No   Work-Related Injury No   Are there other pain locations you wish to document? No           Onset: 8/4/22 bending at work, felt pop and severe shooting LLE pain     Investigations:   Lumbar MRI 9/2022 [facility]: My review of images: bulges L3/4, L4/5, L5/S1. left L5 protrusion. Official report to follow. Addendum :  radiology report: disc bulges L3/4/5/S1. Epidural lipomatosis L3/4/5. Mild-mod stenosis L4/5. Spine surgery consult: No     Treatments:  Physical therapy: Yes with benefit September 2022 discontinued after MRI  Spinal injections: None  Spinal surgery-none  Beneficial medications: Percocet, tramadol  Failed medications: Mobic, no NSAIDs     Work Status:  x 15 yrs. Last worked 10/13/22. Trying to get STD  Pertinent PMHx:  asthma, htn, heel spurs, left shoulder sx, right knee sx. PHYSICAL EXAMINATION    Pulse (!) 111 Comment: pt md karen aware  Ht 6' 1\" (1.854 m)   SpO2 98% Comment: RA  BMI 25.78 kg/m²     Lumbar flexion to shins, pain with extension and left lateral bending  Tender left L4-5  LE strength intact  SLR negative  No edema      OFFICE PROCEDURE PROGRESS NOTE      PROCEDURE: In the office today after informed consent using aseptic technique, the patient was injected with a total of 2 cc of 6 mg/cc Celestone and 1 cc Marcaine into his left iliolumbar trigger point.      Chart reviewed for the following:   I, Dr. Jonny Sandhoff, have reviewed the History, Physical and updated the Allergic reactions for Rik Coyle. Local measures (ice/heat) and medications have not alleviated the symptoms. TIME OUT performed immediately prior to start of procedure:   I, Dr. Catie Romeo, have performed the following reviews on Bryon Mata prior to the start of the procedure:       Patient denies any recent fevers, chills, antibiotics, recent cortisone injections, or infections. * Patient was identified by name and date of birth   * Agreement on procedure being performed was verified  * Risks and Benefits explained to the patient  * Procedure site verified and marked as necessary  * Patient was positioned for comfort  * Consent was signed and verified     Time: 4:56 PM      Date of procedure: 2/20/2023    Procedure performed by:  Gagandeep Payne MD    Provider assisted by: None    Patient assisted by: Self    How tolerated by patient: Pt tolerated the procedure well with no complications. Written by Jairo Smith, as dictated by Catie Romeo MD.  This note was created using Dragon transcription software and may contain unintended errors.

## 2023-02-20 NOTE — LETTER
[facility]  [address] [facility] [address] [place] [address]  Phone: [phone]  Fax: [phone]    Melyssa Matthew MD        February 20, 2023     Patient: Rian Chisholm   YOB: 1965   Date of Visit: 2/20/2023       To Whom It May Concern: It is my medical opinion that Wilder Paul may return to light duty immediately with the following restrictions: lifting/carrying not to exceed 20 lbs. , no repetitive bending or twisting. If you have any questions or concerns, please don't hesitate to call.     Sincerely,        Melyssa Matthew MD

## 2023-02-20 NOTE — PROGRESS NOTES
Dariana Frias presents today for   Chief Complaint   Patient presents with    Back Pain       Is someone accompanying this pt? no    Is the patient using any DME equipment during OV? no      Learning Assessment:  Who is the primary learner? Patient    What is the preferred language for health care of the primary learner? ENGLISH    How does the primary learner prefer to learn new concepts? OTHER (COMMENT) any of them   Answered By patient    Relationship to Learner SELF      Coordination of Care:  1. Have you been to the ER, urgent care clinic since your last visit? no  Hospitalized since your last visit? no    2. Have you seen or consulted any other health care providers outside of the [address] [place] [address] since your last visit? Yes, pcp Include any pap smears or colon screening.  no

## 2023-03-08 ENCOUNTER — TELEPHONE (OUTPATIENT)
Age: 58
End: 2023-03-08

## 2023-03-08 NOTE — TELEPHONE ENCOUNTER
Solomon Yo from [facility] called and stated and peer to peer is needed for pt's disability claim.      Ref # [id number]  Phone # [phone]

## 2023-03-08 NOTE — TELEPHONE ENCOUNTER
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 Apparently his work will not accept him back with these restrictions. I do not see where we have filled out any forms for disability for him. Can you check and see?

## 2023-03-09 ENCOUNTER — TELEPHONE (OUTPATIENT)
Age: 58
End: 2023-03-09

## 2023-03-13 NOTE — TELEPHONE ENCOUNTER
Unable to reach Maurice to get them to fax over a copy of the disability forms and also there is no disability forms scanned into the chart

## 2023-03-17 ENCOUNTER — TELEPHONE (OUTPATIENT)
Age: 58
End: 2023-03-17

## 2023-04-12 PROBLEM — M51.26 PROTRUDED LUMBAR DISC: Status: ACTIVE | Noted: 2023-04-12

## 2023-04-12 PROBLEM — M54.59 MECHANICAL LOW BACK PAIN: Status: ACTIVE | Noted: 2023-04-12

## 2023-04-27 ENCOUNTER — CLINICAL DOCUMENTATION (OUTPATIENT)
Age: 58
End: 2023-04-27

## 2023-04-27 NOTE — PROGRESS NOTES
The pt's letter for STD and the last 3 office notes were faxed over to [facility] at [phone]. A fax confirmation was received.

## 2023-05-04 ENCOUNTER — TELEPHONE (OUTPATIENT)
Age: 58
End: 2023-05-04

## 2023-05-15 NOTE — TELEPHONE ENCOUNTER
Still no response from Dr. Yaquelin Gan office regarding a fax number. Encounter will be closed. I will hold on to signed letter in case they call back.

## 2023-05-18 ENCOUNTER — OFFICE VISIT (OUTPATIENT)
Age: 58
End: 2023-05-18
Payer: COMMERCIAL

## 2023-05-18 VITALS
HEIGHT: 73 IN | TEMPERATURE: 97.3 F | BODY MASS INDEX: 24.78 KG/M2 | OXYGEN SATURATION: 98 % | HEART RATE: 99 BPM | WEIGHT: 187 LBS

## 2023-05-18 DIAGNOSIS — M48.061 LUMBAR STENOSIS WITHOUT NEUROGENIC CLAUDICATION: ICD-10-CM

## 2023-05-18 DIAGNOSIS — M51.26 PROTRUSION OF LUMBAR INTERVERTEBRAL DISC: ICD-10-CM

## 2023-05-18 DIAGNOSIS — M54.50 CHRONIC LEFT-SIDED LOW BACK PAIN WITHOUT SCIATICA: Primary | ICD-10-CM

## 2023-05-18 DIAGNOSIS — G89.29 CHRONIC LEFT-SIDED LOW BACK PAIN WITHOUT SCIATICA: Primary | ICD-10-CM

## 2023-05-18 PROCEDURE — 99214 OFFICE O/P EST MOD 30 MIN: CPT | Performed by: PHYSICAL MEDICINE & REHABILITATION

## 2023-05-18 RX ORDER — DULOXETIN HYDROCHLORIDE 60 MG/1
60 CAPSULE, DELAYED RELEASE ORAL DAILY
Qty: 30 CAPSULE | Refills: 2 | Status: SHIPPED | OUTPATIENT
Start: 2023-05-18

## 2023-05-18 NOTE — PROGRESS NOTES
[address]    [address], [address]  [place], [facility] [address]  Phone: [phone]  Fax: [phone]        Salima Courser  : 1965  PCP: Lucas Lepe MD    PROGRESS NOTE      3 Ousmane Velasquez was seen today for back pain. Diagnoses and all orders for this visit:    Chronic left-sided low back pain without sciatica  -     DULoxetine (CYMBALTA) 60 MG extended release capsule; Take 1 capsule by mouth daily    Protrusion of lumbar intervertebral disc    Lumbar stenosis without neurogenic claudication        Jae Claudio is a 65 y.o. male with left-sided mechanical low back pain. .   Dose adjustment. Increase Cymbalta to 60 mg daily  RF routine Gabapentin at bedtime. Continue Tramadol through PCP  If not improving with medication change, consider spine injections next visit, either JOSE ALEJANDRO or facet. Follow-up and Dispositions    Return in about 6 weeks (around 2023) for medication management. HISTORY OF PRESENT ILLNESS      Jae Claudio is a 65 y.o. male presents for follow up of back pain. LV trigger point injection left iliolumbar. Patient reports benefit with trigger point injection last visit. He is still having benefit from this injection. Pt reports left-sided low back pain exacerbated with bending and increased activity. Denies any sciatic pain. He has been on Cymbalta for 2 months with some benefit. Denies any depression. He is no longer taking Percocet. Ro[place] He takes Tramadol as needed, Cymbalta 30 mg daily, and Gabapentin QHS with some benefit. Denies side effects. Pt receives Tramadol through PCP. He notes relief with trigger point injection. Denies red flags including persistent fevers, chills, weight changes, neurogenic bowel or bladder symptoms, but affirms intermittent urinary incontinence. Denies bowel incontinence.     Since , Dr. Prem Maria called to request peer to peer, we were

## 2023-05-18 NOTE — PROGRESS NOTES
Jacky Joyce presents today for   Chief Complaint   Patient presents with    Back Pain       Is someone accompanying this pt? no    Is the patient using any DME equipment during OV? no    Depression Screening:  PHQ-9 Questionaire 2/20/2023   Little interest or pleasure in doing things 3   Feeling down, depressed, or hopeless 3   Trouble falling or staying asleep, or sleeping too much 3   Feeling tired or having little energy 2   Poor appetite or overeating 3   Feeling bad about yourself - or that you are a failure or have let yourself or your family down 3   Trouble concentrating on things, such as reading the newspaper or watching television 0   Moving or speaking so slowly that other people could have noticed. Or the opposite - being so fidgety or restless that you have been moving around a lot more than usual 0   Thoughts that you would be better off dead, or of hurting yourself in some way 0   PHQ-9 Total Score 17   If you checked off any problems, how difficult have these problems made it for you to do your work, take care of things at home, or get along with other people? 2     PHQ Scores 2/20/2023   PHQ2 Score 6   PHQ9 Score 17         Coordination of Care:  1. Have you been to the ER, urgent care clinic since your last visit? no  Hospitalized since your last visit? no    2. Have you seen or consulted any other health care providers outside of the [address] [place] [address] since your last visit? no Include any pap smears or colon screening.  no

## 2023-05-26 ENCOUNTER — TELEPHONE (OUTPATIENT)
Age: 58
End: 2023-05-26

## 2023-05-26 ENCOUNTER — HOSPITAL ENCOUNTER (OUTPATIENT)
Facility: HOSPITAL | Age: 58
Discharge: HOME | End: 2023-05-26
Payer: COMMERCIAL

## 2023-05-26 DIAGNOSIS — Z12.11 SPECIAL SCREENING FOR MALIGNANT NEOPLASMS, COLON: ICD-10-CM

## 2023-05-26 DIAGNOSIS — R10.812 LEFT UPPER QUADRANT ABDOMINAL TENDERNESS, REBOUND TENDERNESS PRESENCE NOT SPECIFIED: ICD-10-CM

## 2023-05-26 DIAGNOSIS — Z80.0 FAMILY HISTORY OF MALIGNANT NEOPLASM OF GASTROINTESTINAL TRACT: ICD-10-CM

## 2023-05-26 DIAGNOSIS — R11.0 NAUSEA: ICD-10-CM

## 2023-05-26 DIAGNOSIS — K29.70 GASTROESOPHAGITIS: ICD-10-CM

## 2023-05-26 DIAGNOSIS — K20.90 GASTROESOPHAGITIS: ICD-10-CM

## 2023-05-26 DIAGNOSIS — R10.12 LUQ ABDOMINAL PAIN: ICD-10-CM

## 2023-05-26 DIAGNOSIS — F17.210 CIGARETTE SMOKER: ICD-10-CM

## 2023-05-26 LAB — CREAT UR-MCNC: 0.8 MG/DL (ref 0.6–1.3)

## 2023-05-26 PROCEDURE — 6360000004 HC RX CONTRAST MEDICATION: Performed by: PHYSICIAN ASSISTANT

## 2023-05-26 PROCEDURE — A9577 INJ MULTIHANCE: HCPCS | Performed by: PHYSICIAN ASSISTANT

## 2023-05-26 PROCEDURE — 74183 MRI ABD W/O CNTR FLWD CNTR: CPT

## 2023-05-26 PROCEDURE — 82565 ASSAY OF CREATININE: CPT

## 2023-05-26 RX ADMIN — GADOBENATE DIMEGLUMINE 20 ML: 529 INJECTION, SOLUTION INTRAVENOUS at 09:16

## 2023-05-26 NOTE — TELEPHONE ENCOUNTER
Attempted to contact Yana More regarding her message. She was not able to be reached. A message was left for her asking her to contact the office about the message. Just want to get some clarification on what she is actually looking for. They should have the start date of when the pt was first seen here. The number to our office was provided.

## 2023-05-26 NOTE — TELEPHONE ENCOUNTER
Lulu Del Real from [employer] is requesting a call back states the employer needs a beginning and end date of when patient has been seen in order to process patient benefits.         [phone]

## 2023-05-26 NOTE — TELEPHONE ENCOUNTER
I spoke to [place] and she was able to confirm that the pt was covered for his from his January appt through April for his STD coverage. She can now get the pt paid with his full benefits. Nothing further needed at this time.

## 2023-07-06 ENCOUNTER — OFFICE VISIT (OUTPATIENT)
Age: 58
End: 2023-07-06
Payer: COMMERCIAL

## 2023-07-06 VITALS
WEIGHT: 196.4 LBS | HEART RATE: 71 BPM | OXYGEN SATURATION: 100 % | BODY MASS INDEX: 26.03 KG/M2 | HEIGHT: 73 IN | TEMPERATURE: 97.1 F | RESPIRATION RATE: 18 BRPM

## 2023-07-06 DIAGNOSIS — M48.062 LUMBAR STENOSIS WITH NEUROGENIC CLAUDICATION: Primary | ICD-10-CM

## 2023-07-06 DIAGNOSIS — D17.79 EPIDURAL LIPOMATOSIS: ICD-10-CM

## 2023-07-06 PROCEDURE — 99214 OFFICE O/P EST MOD 30 MIN: CPT | Performed by: PHYSICAL MEDICINE & REHABILITATION

## 2023-07-06 RX ORDER — DULOXETIN HYDROCHLORIDE 60 MG/1
60 CAPSULE, DELAYED RELEASE ORAL DAILY
Qty: 30 CAPSULE | Refills: 5 | Status: SHIPPED | OUTPATIENT
Start: 2023-07-06

## 2023-07-06 NOTE — PROGRESS NOTES
[facility]    [address], [address] [place] [address] Dr  Phone: [phone]  Fax: [phone]        Marybeth Bliss  : 1965  PCP: Barb Harkins MD    PROGRESS NOTE      [address] was seen today for back problem, pain and back pain. Diagnoses and all orders for this visit:    Lumbar stenosis with neurogenic claudication  -     DULoxetine (CYMBALTA) 60 MG extended release capsule; Take 1 capsule by mouth daily        Lucas Phelan is a 65 y.o. male with chronic low back pain and usually left sciatic symptoms. Since last month symptoms are now bilateral anterior thigh limiting his ambulation. No trauma. Advised to continue HEP as tolerated. Continue routine Gabapentin at bedtime. Unable to tolerate during the day due to hallucinations. Continue Cymbalta 60 mg  Continue Tramadol through PCP. Risks, benefits, alternatives, and limitations of JOSE ALEJANDRO discussed with patient. Pt will consider JOSE ALEJANDRO L3, L4 once he figures out his insurance issue. Pt will call when he has an update on coverage. Pt will drop off LTD paperwork. Permanent work restrictions: No lifting greater than 20 pounds, avoid repetitive bending and twisting at the waist, no prolonged standing. Discussed gabapentin increased from 300 nightly. Patient reports hallucinations with higher doses. .     Follow-up and Dispositions    Return in about 4 weeks (around 8/3/2023) for fu after injections. HISTORY OF PRESENT ILLNESS      Lucas Phelan is a 65 y.o. male presents for follow up of low back pain. LV dose adjustment. Increase Cymbalta to 60 mg daily. Reports that the Cymbalta increased has helped some with his back. Denies side effects. Pt reports low back pain with numbness and tingling in anterior BLE to shins. In 2023, pt started to notice B/L Paresthesia in anterior thighs. Back > legs. Denies recent trauma.   He

## 2023-07-06 NOTE — PROGRESS NOTES
Tonie Rey presents today for   Chief Complaint   Patient presents with    Back Problem    Pain    Back Pain       Is someone accompanying this pt? no    Is the patient using any DME equipment during OV? no    Depression Screening:  No flowsheet data found. Learning Assessment:  No flowsheet data found. Abuse Screening:  No flowsheet data found. Fall Risk  No flowsheet data found. OPIOID RISK TOOL  No flowsheet data found. Coordination of Care:  1. Have you been to the ER, urgent care clinic since your last visit? no  Hospitalized since your last visit? no    2. Have you seen or consulted any other health care providers outside of the [address] [place] [address] since your last visit? no Include any pap smears or colon screening.  no

## 2023-07-25 PROBLEM — R55 SYNCOPE AND COLLAPSE: Status: ACTIVE | Noted: 2023-07-25

## 2023-08-04 ENCOUNTER — TELEPHONE (OUTPATIENT)
Age: 58
End: 2023-08-04

## 2023-08-04 NOTE — TELEPHONE ENCOUNTER
Mr. Homero Altman insurance has terminated as of 6/22/2023. I have spoke to him several times in reference to an update. He stated he would call me back on Thursday 8/3/2023 to give me an update. I call him on 8/4/2023 at 2:25pm and left a voice message that his injection procedure is cancelled for 8/8/2023 due to no insurance information. If he does has new insurance I would still need time to get an authorization in place.

## 2023-08-16 ENCOUNTER — HOSPITAL ENCOUNTER (OUTPATIENT)
Facility: HOSPITAL | Age: 58
Discharge: HOME | End: 2023-08-19
Payer: COMMERCIAL

## 2023-08-16 DIAGNOSIS — I10 ESSENTIAL HYPERTENSION, MALIGNANT: ICD-10-CM

## 2023-08-16 DIAGNOSIS — N18.1 STAGE 1 CHRONIC KIDNEY DISEASE: ICD-10-CM

## 2023-08-16 LAB
ANION GAP SERPL CALC-SCNC: 7 MMOL/L (ref 3–18)
BASOPHILS # BLD: 0.1 K/UL (ref 0–0.1)
BASOPHILS NFR BLD: 1 % (ref 0–2)
BUN SERPL-MCNC: 7 MG/DL (ref 7–18)
BUN/CREAT SERPL: 8 (ref 12–20)
CALCIUM SERPL-MCNC: 9.7 MG/DL (ref 8.5–10.1)
CHLORIDE SERPL-SCNC: 99 MMOL/L (ref 100–111)
CO2 SERPL-SCNC: 28 MMOL/L (ref 21–32)
CREAT SERPL-MCNC: 0.87 MG/DL (ref 0.6–1.3)
DIFFERENTIAL METHOD BLD: ABNORMAL
EOSINOPHIL # BLD: 0.2 K/UL (ref 0–0.4)
EOSINOPHIL NFR BLD: 3 % (ref 0–5)
ERYTHROCYTE [DISTWIDTH] IN BLOOD BY AUTOMATED COUNT: 15.9 % (ref 11.6–14.5)
GLUCOSE SERPL-MCNC: 142 MG/DL (ref 74–99)
HCT VFR BLD AUTO: 37.9 % (ref 36–48)
HGB BLD-MCNC: 12.7 G/DL (ref 13–16)
IMM GRANULOCYTES # BLD AUTO: 0 K/UL (ref 0–0.04)
IMM GRANULOCYTES NFR BLD AUTO: 0 % (ref 0–0.5)
LYMPHOCYTES # BLD: 2.5 K/UL (ref 0.9–3.6)
LYMPHOCYTES NFR BLD: 34 % (ref 21–52)
MAGNESIUM SERPL-MCNC: 2.4 MG/DL (ref 1.6–2.6)
MCH RBC QN AUTO: 33.9 PG (ref 24–34)
MCHC RBC AUTO-ENTMCNC: 33.5 G/DL (ref 31–37)
MCV RBC AUTO: 101.1 FL (ref 78–100)
MONOCYTES # BLD: 0.6 K/UL (ref 0.05–1.2)
MONOCYTES NFR BLD: 8 % (ref 3–10)
NEUTS SEG # BLD: 4 K/UL (ref 1.8–8)
NEUTS SEG NFR BLD: 54 % (ref 40–73)
NRBC # BLD: 0 K/UL (ref 0–0.01)
NRBC BLD-RTO: 0 PER 100 WBC
PLATELET # BLD AUTO: 344 K/UL (ref 135–420)
PMV BLD AUTO: 9.8 FL (ref 9.2–11.8)
POTASSIUM SERPL-SCNC: 4.8 MMOL/L (ref 3.5–5.5)
RBC # BLD AUTO: 3.75 M/UL (ref 4.35–5.65)
SODIUM SERPL-SCNC: 134 MMOL/L (ref 136–145)
WBC # BLD AUTO: 7.4 K/UL (ref 4.6–13.2)

## 2023-08-16 PROCEDURE — 85025 COMPLETE CBC W/AUTO DIFF WBC: CPT

## 2023-08-16 PROCEDURE — 80048 BASIC METABOLIC PNL TOTAL CA: CPT

## 2023-08-16 PROCEDURE — 83735 ASSAY OF MAGNESIUM: CPT

## 2023-08-16 PROCEDURE — 36415 COLL VENOUS BLD VENIPUNCTURE: CPT

## 2023-09-14 ENCOUNTER — OFFICE VISIT (OUTPATIENT)
Age: 58
End: 2023-09-14

## 2023-09-14 VITALS
WEIGHT: 188 LBS | OXYGEN SATURATION: 99 % | HEART RATE: 79 BPM | HEIGHT: 73 IN | SYSTOLIC BLOOD PRESSURE: 133 MMHG | DIASTOLIC BLOOD PRESSURE: 77 MMHG | TEMPERATURE: 97.4 F | BODY MASS INDEX: 24.92 KG/M2

## 2023-09-14 DIAGNOSIS — M48.062 LUMBAR STENOSIS WITH NEUROGENIC CLAUDICATION: Primary | ICD-10-CM

## 2023-09-14 DIAGNOSIS — D17.79 EPIDURAL LIPOMATOSIS: ICD-10-CM

## 2023-09-14 RX ORDER — GABAPENTIN 300 MG/1
300 CAPSULE ORAL
Qty: 30 CAPSULE | Refills: 2 | Status: SHIPPED | OUTPATIENT
Start: 2023-09-14 | End: 2023-12-13

## 2023-09-14 NOTE — PROGRESS NOTES
Roly Simon presents today for   Chief Complaint   Patient presents with    Lower Back Pain       Is someone accompanying this pt? no    Is the patient using any DME equipment during OV? no      Coordination of Care:  1. Have you been to the ER, urgent care clinic since your last visit? no  Hospitalized since your last visit? Yes, [facility]    2. Have you seen or consulted any other health care providers outside of the [address] [place] [address] since your last visit? no Include any pap smears or colon screening.  no
takes Gabapentin 300 mg QHS w/benefit, denies following day grogginess. Denies red flags including persistent fevers, chills, weight changes, neurogenic bowel or bladder symptoms.  reviewed. 90 day MME=13.3. 2year average=24.4      9/14/2023    12:46 PM   AMB PAIN ASSESSMENT   Location of Pain Back   Severity of Pain 4   Quality of Pain Sharp   Duration of Pain Persistent   Frequency of Pain Intermittent   Aggravating Factors Bending;Stretching;Straightening;Kneeling;Squatting; Walking;Standing;Stairs; Exercise   Limiting Behavior Yes   Relieving Factors Rest   Result of Injury No   Work-Related Injury No   Are there other pain locations you wish to document? No           Onset: 8/4/22 bending at work, felt pop and severe shooting LLE pain. 6/2023 started to have bilateral anterior thigh paresthesias with ambulation     Investigations:   Lumbar MRI 9/2022 [facility]: My review of images: bulges L3/4, L4/5, L5/S1. left L5 protrusion. Official report to follow. Addendum :  radiology report: disc bulges L3/4/5/S1. Epidural lipomatosis L3/4/5. Mild-mod stenosis L4/5. Spine surgery consult: No     Treatments:  Physical therapy: Yes with benefit September 2022 discontinued after MRI  Spinal injections: None  Spinal surgery-none  Beneficial medications: Percocet, tramadol (through PCP-was using prior to 8/2022 injury), Gabapentin (unable to tolerate greater than 300 mg a day due to hallucinations), Cymbalta  Failed medications: Mobic, no NSAIDs     Work Status:  LTD 2023(up to 3years). STD through 4/13/2023. 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 Acute work-related low back lifting injury 8/4/2022. Had PT and light duty 8-10/2022. MRI done, patient returned to full duty and WC case closed. Reports increased LBP after RTW. Last worked 10/13/22. Reports no light duty available offered to him. 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       Pertinent PMHx:

## 2023-09-14 NOTE — H&P (VIEW-ONLY)
[facility]    [address], [address] [place] [address] Dr  Phone: [phone]  Fax: [phone]        Emily Hsu  : 1965  PCP: Brittnee Vallecillo MD    PROGRESS NOTE      [address] was seen today for lower back pain. Diagnoses and all orders for this visit:    Lumbar stenosis with neurogenic claudication  -     Ambulatory Referral to Ortho Injection  -     gabapentin (NEURONTIN) 300 MG capsule; Take 1 capsule by mouth nightly for 90 days. Max Daily Amount: 300 mg    Epidural lipomatosis  -     gabapentin (NEURONTIN) 300 MG capsule; Take 1 capsule by mouth nightly for 90 days. Max Daily Amount: 300 mg        Pedro Fragoso is a 65 y.o. male with symptomatic LSS. Proceed w/JOSE ALEJANDRO L3/4. R/B/A reviewed. Failed PT/meds. Cont. Cymbalta 60mg QAM and Gabapentin 300mg QHS. Cont Tramadol through Dr. Zacarias Reno (chronic, pre-injury)  Cont HEP. HISTORY OF PRESENT ILLNESS      Pedro Fragoso is a 65 y.o. male presents for follow up of low back pain. LV 2023 discussed JOSE ALEJANDRO. Patient was recently hospitalized for 5 days for acute renal failure, hypomagnesemia and severe hypokalemia (potassium of 1.6). This is the third episode of his potassium dropping. Patient reports he did receive insurance. He is still out of work on long-term disability for 3 years and can renewed it. They are trying to find him alternative employment. Patient continues with left lower back pain that radiates to his left buttock and left anterior thigh. He can only walk 5-10 minutes before it elicits pain. Patient states that his PT was helpful but benefit was diminished by having to immediately work a 12 hour shift. He maintains his HEP. He has diabetes (A1c 2022 = 6.1). His sugars this morning after drinking a protein shake was 150. Patient usually takes Tramadol 50 mg about once a day.  He also takes Duloxetine 60 mg Qam. He also

## 2023-09-21 ENCOUNTER — HOSPITAL ENCOUNTER (OUTPATIENT)
Facility: HOSPITAL | Age: 58
Discharge: HOME | End: 2023-09-21
Payer: COMMERCIAL

## 2023-09-21 ENCOUNTER — TRANSCRIBE ORDERS (OUTPATIENT)
Facility: HOSPITAL | Age: 58
End: 2023-09-21

## 2023-09-21 DIAGNOSIS — E11.65 INADEQUATELY CONTROLLED DIABETES MELLITUS (HCC): ICD-10-CM

## 2023-09-21 DIAGNOSIS — E11.65 TYPE II DIABETES MELLITUS WITH HYPEROSMOLARITY, UNCONTROLLED (HCC): Primary | ICD-10-CM

## 2023-09-21 DIAGNOSIS — E11.00 TYPE II DIABETES MELLITUS WITH HYPEROSMOLARITY, UNCONTROLLED (HCC): Primary | ICD-10-CM

## 2023-09-21 LAB
ANION GAP SERPL CALC-SCNC: 5 MMOL/L (ref 3–18)
BUN SERPL-MCNC: 4 MG/DL (ref 7–18)
BUN/CREAT SERPL: 3 (ref 12–20)
CALCIUM SERPL-MCNC: 8.8 MG/DL (ref 8.5–10.1)
CHLORIDE SERPL-SCNC: 85 MMOL/L (ref 100–111)
CO2 SERPL-SCNC: 43 MMOL/L (ref 21–32)
CREAT SERPL-MCNC: 1.15 MG/DL (ref 0.6–1.3)
CREAT UR-MCNC: 59 MG/DL (ref 30–125)
GLUCOSE SERPL-MCNC: 134 MG/DL (ref 74–99)
MICROALBUMIN UR-MCNC: 20.6 MG/DL (ref 0–3)
MICROALBUMIN/CREAT UR-RTO: 349 MG/G (ref 0–30)
POTASSIUM SERPL-SCNC: 2.1 MMOL/L (ref 3.5–5.5)
SODIUM SERPL-SCNC: 133 MMOL/L (ref 136–145)

## 2023-09-21 PROCEDURE — 82043 UR ALBUMIN QUANTITATIVE: CPT

## 2023-09-21 PROCEDURE — 80048 BASIC METABOLIC PNL TOTAL CA: CPT

## 2023-09-21 PROCEDURE — 82570 ASSAY OF URINE CREATININE: CPT

## 2023-09-21 PROCEDURE — 36415 COLL VENOUS BLD VENIPUNCTURE: CPT

## 2023-09-26 ENCOUNTER — HOSPITAL ENCOUNTER (OUTPATIENT)
Facility: HOSPITAL | Age: 58
Discharge: HOME | End: 2023-09-29
Payer: COMMERCIAL

## 2023-09-26 VITALS
RESPIRATION RATE: 18 BRPM | DIASTOLIC BLOOD PRESSURE: 76 MMHG | OXYGEN SATURATION: 95 % | HEART RATE: 79 BPM | SYSTOLIC BLOOD PRESSURE: 135 MMHG | TEMPERATURE: 98.5 F

## 2023-09-26 LAB — GLUCOSE BLD STRIP.AUTO-MCNC: 166 MG/DL (ref 70–110)

## 2023-09-26 PROCEDURE — 6370000000 HC RX 637 (ALT 250 FOR IP): Performed by: PHYSICAL MEDICINE & REHABILITATION

## 2023-09-26 PROCEDURE — 62323 NJX INTERLAMINAR LMBR/SAC: CPT

## 2023-09-26 PROCEDURE — 6360000004 HC RX CONTRAST MEDICATION: Performed by: PHYSICAL MEDICINE & REHABILITATION

## 2023-09-26 PROCEDURE — 2500000003 HC RX 250 WO HCPCS: Performed by: PHYSICAL MEDICINE & REHABILITATION

## 2023-09-26 PROCEDURE — 82962 GLUCOSE BLOOD TEST: CPT

## 2023-09-26 PROCEDURE — 6360000002 HC RX W HCPCS: Performed by: PHYSICAL MEDICINE & REHABILITATION

## 2023-09-26 RX ORDER — DIAZEPAM 5 MG/1
2.5 TABLET ORAL ONCE
Status: COMPLETED | OUTPATIENT
Start: 2023-09-26 | End: 2023-09-26

## 2023-09-26 RX ORDER — LIDOCAINE HYDROCHLORIDE 10 MG/ML
30 INJECTION, SOLUTION EPIDURAL; INFILTRATION; INTRACAUDAL; PERINEURAL ONCE
Status: COMPLETED | OUTPATIENT
Start: 2023-09-26 | End: 2023-09-26

## 2023-09-26 RX ORDER — DIAZEPAM 5 MG/1
10 TABLET ORAL ONCE
Status: COMPLETED | OUTPATIENT
Start: 2023-09-26 | End: 2023-09-26

## 2023-09-26 RX ORDER — DIAZEPAM 5 MG/1
5 TABLET ORAL ONCE
Status: COMPLETED | OUTPATIENT
Start: 2023-09-26 | End: 2023-09-26

## 2023-09-26 RX ORDER — DEXAMETHASONE SODIUM PHOSPHATE 10 MG/ML
10 INJECTION, SOLUTION INTRAMUSCULAR; INTRAVENOUS ONCE
Status: COMPLETED | OUTPATIENT
Start: 2023-09-26 | End: 2023-09-26

## 2023-09-26 RX ORDER — IOPAMIDOL 408 MG/ML
4 INJECTION, SOLUTION INTRATHECAL
Status: COMPLETED | OUTPATIENT
Start: 2023-09-26 | End: 2023-09-26

## 2023-09-26 RX ADMIN — DEXAMETHASONE SODIUM PHOSPHATE 10 MG: 10 INJECTION, SOLUTION INTRAMUSCULAR; INTRAVENOUS at 12:20

## 2023-09-26 RX ADMIN — LIDOCAINE HYDROCHLORIDE 15 ML: 10 INJECTION, SOLUTION EPIDURAL; INFILTRATION; INTRACAUDAL; PERINEURAL at 12:14

## 2023-09-26 RX ADMIN — DIAZEPAM 10 MG: 5 TABLET ORAL at 11:49

## 2023-09-26 RX ADMIN — IOPAMIDOL 1 ML: 408 INJECTION, SOLUTION INTRATHECAL at 12:20

## 2023-09-26 ASSESSMENT — PAIN SCALES - GENERAL: PAINLEVEL_OUTOF10: 0

## 2023-09-26 ASSESSMENT — PAIN - FUNCTIONAL ASSESSMENT: PAIN_FUNCTIONAL_ASSESSMENT: 0-10

## 2023-09-26 NOTE — INTERVAL H&P NOTE
Update History & Physical    The patient's History and Physical of September 14, 2023 was reviewed. There was no change. The surgical site was confirmed by the patient and me. Plan: The risks, benefits, expected outcome, and alternative to the recommended procedure have been discussed with the patient. Patient understands and wants to proceed with the procedure.      Electronically signed by Any Davidson MD on 9/26/2023 at 12:06 PM

## 2023-09-26 NOTE — PROCEDURES
Intralaminar Epidural Steroid Procedure Note        Patient Name   Cam Chimera  Date of Procedure: September 26, 2023  Preoperative Diagnosis: Lumbar spinal stenosis with claudication  Postoperative Diagnosis: Same  Location [facility], [facility]      Procedure:  Epidural Steroid Injection    Consent:  Informed consent was obtained prior to the procedure. In addition to the potential risks associated with the procedure itself, the patient was informed both verbally and in writing of the potential side effects of the use of glucocorticoid. The patient appeared to comprehend the informed consent and desired to have the procedure performed. Procedure in Detail:  The patient was taken to the procedure suite and placed in the prone position on the operating table on appropriate padding. The posterior lumbar region was prepped and draped in the usual sterile fashion. Intraoperative fluoroscopy was used to localize the L3-L4 interspace. The skin was infiltrated with 1% lidocaine. An 18-gauge standard spinal Tuohy needle was advanced into the epidural space at L3-L4 under fluoroscopic guidance using the loss of resistance technique. No cerebrospinal fluid was seen throughout the procedure. Yes  A small amount of Isovue was injected into the epidural space, confirming appropriated needle placement on fluoroscopy. No vascular uptake was identified. Next, 2ml of 1% Lidocaine and 10mg of preservative free Dexamethasone were injected via the Tuohy needle. The needle was removed from the patient. The patient tolerated the procedure well and was discharged home with designated  and care instructions. Denies headache. No bleeding from injection site. Hip flexor strength intact. Straight leg raise negative. Patient reported kathleen-procedural pain on Visual Analog Scale:  pre-4; post-0.       Signed By: Thien Dickson MD

## 2023-11-06 ENCOUNTER — OFFICE VISIT (OUTPATIENT)
Age: 58
End: 2023-11-06
Payer: COMMERCIAL

## 2023-11-06 VITALS
TEMPERATURE: 97.9 F | HEIGHT: 73 IN | WEIGHT: 190 LBS | BODY MASS INDEX: 25.18 KG/M2 | HEART RATE: 94 BPM | SYSTOLIC BLOOD PRESSURE: 122 MMHG | OXYGEN SATURATION: 99 % | DIASTOLIC BLOOD PRESSURE: 77 MMHG

## 2023-11-06 DIAGNOSIS — D17.79 EPIDURAL LIPOMATOSIS: ICD-10-CM

## 2023-11-06 DIAGNOSIS — M51.26 PROTRUSION OF LUMBAR INTERVERTEBRAL DISC: Primary | ICD-10-CM

## 2023-11-06 DIAGNOSIS — M48.062 LUMBAR STENOSIS WITH NEUROGENIC CLAUDICATION: ICD-10-CM

## 2023-11-06 PROBLEM — E88.2 EPIDURAL LIPOMATOSIS: Status: ACTIVE | Noted: 2023-11-06

## 2023-11-06 PROCEDURE — 99213 OFFICE O/P EST LOW 20 MIN: CPT | Performed by: PHYSICAL MEDICINE & REHABILITATION

## 2023-11-06 RX ORDER — METOPROLOL SUCCINATE 25 MG/1
25 TABLET, EXTENDED RELEASE ORAL DAILY
COMMUNITY
Start: 2023-10-19

## 2023-11-06 RX ORDER — GABAPENTIN 300 MG/1
300 CAPSULE ORAL
Qty: 30 CAPSULE | Refills: 2 | Status: SHIPPED | OUTPATIENT
Start: 2023-11-06 | End: 2024-02-04

## 2023-11-06 NOTE — PROGRESS NOTES
Maycol Starks presents today for   Chief Complaint   Patient presents with    Lower Back Pain       Is someone accompanying this pt? No     Is the patient using any DME equipment during OV? Yes, walking stick       Coordination of Care:  1. Have you been to the ER, urgent care clinic since your last visit? no  Hospitalized since your last visit? no    2. Have you seen or consulted any other health care providers outside of the [address] [place] [address] since your last visit? no Include any pap smears or colon screening.  no
also takes Duloxetine 60 mg Qam. He also takes Gabapentin 300 mg QHS w/ benefit. Pt denies any recent fevers, chills, headaches, or infections. 11/6/2023     1:24 PM   AMB PAIN ASSESSMENT   Location of Pain Back   Severity of Pain 5   Quality of Pain Aching   Duration of Pain Persistent   Frequency of Pain Constant   Aggravating Factors Other (Comment)   Limiting Behavior Yes   Relieving Factors Other (Comment)   Result of Injury No   Work-Related Injury Yes   Are there other pain locations you wish to document? No           Onset: 8/4/22 bending at work, felt pop and severe shooting LLE pain. 6/2023 started to have bilateral anterior thigh paresthesias with ambulation     Investigations:   Lumbar MRI 9/2022 [facility]: My review of images: bulges L3/4, L4/5, L5/S1. left L5 protrusion. Official report to follow. Addendum :  radiology report: disc bulges L3/4/5/S1. Epidural lipomatosis L3/4/5. Mild-mod stenosis L4/5. Spine surgery consult: No     Treatments:  Physical therapy: Yes September 2022 discontinued with benefit after MRI  Spinal injections: None  Spinal surgery-none  Beneficial medications: Percocet, tramadol (through PCP-was using prior to 8/2022 injury), Gabapentin (unable to tolerate greater than 300 mg a day due to hallucinations), Cymbalta  Failed medications: Mobic, no NSAIDs     Work Status:  LTD 2023(up to 3years). STD through 4/13/2023. 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 Acute work-related low back lifting injury 8/4/2022. Had PT and light duty 8-10/2022. MRI done, patient returned to full duty and  case closed. Reports increased LBP after RTW. Last worked 10/13/22. Reports no light duty available offered to him. 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 Pertinent PMHx:  asthma, htn, heel spurs, left shoulder sx, right knee sx, hx of PUD.  - Chronic Tramadol # Q 1-2 months since at least 10/2021 through

## 2023-11-08 ENCOUNTER — HOSPITAL ENCOUNTER (OUTPATIENT)
Facility: HOSPITAL | Age: 58
Discharge: HOME | End: 2023-11-11

## 2023-11-08 LAB — SENTARA SPECIMEN COLLECTION: NORMAL

## 2023-12-13 ENCOUNTER — HOSPITAL ENCOUNTER (OUTPATIENT)
Facility: HOSPITAL | Age: 58
Discharge: HOME | End: 2023-12-16

## 2023-12-13 LAB — SENTARA SPECIMEN COLLECTION: NORMAL

## 2024-01-10 ENCOUNTER — TELEPHONE (OUTPATIENT)
Age: 59
End: 2024-01-10

## 2024-01-10 NOTE — TELEPHONE ENCOUNTER
Alicia from [employer] is request 12/18/23 paperwork, She is asking if you are not treating the patient for work restrictions please look at page number 1 and check I have no opinion and please fax back. If you are treating the patient with work restrictions please fill out the forms and fax back.    Alicia from [employer] contact numbers  Phone: [phone]  Fax: [phone]

## 2024-01-11 ENCOUNTER — TELEPHONE (OUTPATIENT)
Age: 59
End: 2024-01-11

## 2024-01-11 NOTE — TELEPHONE ENCOUNTER
Attempted to call patient to schedule a virtual for paperwork. Lvm for patient to call back and schedule virtual appt with chapis

## 2024-01-17 ENCOUNTER — HOSPITAL ENCOUNTER (OUTPATIENT)
Facility: HOSPITAL | Age: 59
Discharge: HOME | End: 2024-01-20

## 2024-01-17 LAB — SENTARA SPECIMEN COLLECTION: NORMAL

## 2024-01-18 ENCOUNTER — TELEPHONE (OUTPATIENT)
Age: 59
End: 2024-01-18

## 2024-01-31 ENCOUNTER — TELEMEDICINE (OUTPATIENT)
Age: 59
End: 2024-01-31

## 2024-01-31 DIAGNOSIS — D17.79 EPIDURAL LIPOMATOSIS: ICD-10-CM

## 2024-01-31 DIAGNOSIS — M48.062 LUMBAR STENOSIS WITH NEUROGENIC CLAUDICATION: ICD-10-CM

## 2024-01-31 DIAGNOSIS — M51.26 PROTRUSION OF LUMBAR INTERVERTEBRAL DISC: Primary | ICD-10-CM

## 2024-01-31 RX ORDER — TRAZODONE HYDROCHLORIDE 100 MG/1
100 TABLET ORAL NIGHTLY
COMMUNITY
Start: 2023-12-24

## 2024-01-31 NOTE — PROGRESS NOTES
Bryon D ELA VEGA Adolfo presents today for   Chief Complaint   Patient presents with    Back Problem    Pain    Back Pain       Is someone accompanying this pt? no    Is the patient using any DME equipment during OV? no    Depression Screening:       No data to display                Learning Assessment:      Abuse Screening:       No data to display                Fall Risk      OPIOID RISK TOOL      Coordination of Care:  1. Have you been to the ER, urgent care clinic since your last visit? no  Hospitalized since your last visit? no    2. Have you seen or consulted any other health care providers outside of the [facility] since your last visit? no Include any pap smears or colon screening. no

## 2024-01-31 NOTE — PROGRESS NOTES
Bryon Mata is a 58 y.o. male evaluated via telephone on 1/31/2024 for Back Problem, Pain, and Back Pain  .      History of Present Illness: The patient is a 58-year-old male who has back pain with right sciatica.  Sciatica is intermittent now.  He used to have left-sided pain.  He is on long-term disability through his work.  Purpose this visit is to fill out his disability form.    Physical Exam: The patient is a 58-year-old male who is alert and oriented.  Spoke with fluency.  He did not appear to be in distress.      Assessment/Plan: This patient with protrusion of lumbar disc, stenosis and epidural lipomatosis.  We have filled out his form for him.  Dr. Tran will review it and sign it.  We will let him know when it is ready for pickup.    Bryon was seen today for back problem, pain and back pain.    Diagnoses and all orders for this visit:    Protrusion of lumbar intervertebral disc    Lumbar stenosis with neurogenic claudication    Epidural lipomatosis          Documentation:  I communicated with the patient and/or health care decision maker about back pain.   Details of this discussion including any medical advice provided: Yes    Total Time: minutes: 21-30 minutes    Bryon Mata was evaluated through a synchronous (real-time) audio encounter. Patient identification was verified at the start of the visit. He (or guardian if applicable) is aware that this is a billable service, which includes applicable co-pays. This visit was conducted with the patient's (and/or legal guardian's) verbal consent. He has not had a related appointment within my department in the past 7 days or scheduled within the next 24 hours.   The patient was located at Home: [address]  [address]  [facility] [address].  The provider was located at Facility (Appt Dept): [address] [place] [address]  [address]  [place] [address].    I am licensed in the [facility]    Note: not billable if this call serves to

## 2024-02-05 ENCOUNTER — TELEPHONE (OUTPATIENT)
Age: 59
End: 2024-02-05

## 2024-02-05 NOTE — TELEPHONE ENCOUNTER
Caller is requesting the [employer] paper work that was sent on 12/12 be sent back to the fax [phone]

## 2024-02-08 ENCOUNTER — OFFICE VISIT (OUTPATIENT)
Age: 59
End: 2024-02-08
Payer: COMMERCIAL

## 2024-02-08 VITALS
BODY MASS INDEX: 27.51 KG/M2 | OXYGEN SATURATION: 99 % | HEIGHT: 73 IN | HEART RATE: 98 BPM | WEIGHT: 207.6 LBS | RESPIRATION RATE: 18 BRPM | TEMPERATURE: 98.4 F

## 2024-02-08 DIAGNOSIS — D17.79 EPIDURAL LIPOMATOSIS: ICD-10-CM

## 2024-02-08 DIAGNOSIS — M48.062 LUMBAR STENOSIS WITH NEUROGENIC CLAUDICATION: ICD-10-CM

## 2024-02-08 PROCEDURE — 99212 OFFICE O/P EST SF 10 MIN: CPT | Performed by: PHYSICAL MEDICINE & REHABILITATION

## 2024-02-08 NOTE — PROGRESS NOTES
Bryon CEFERINO Mata presents today for   Chief Complaint   Patient presents with    Back Problem    Pain    Back Pain       Is someone accompanying this pt? no    Is the patient using any DME equipment during OV? no    Depression Screening:       No data to display                Learning Assessment:  No flowsheet data found.    Abuse Screening:       No data to display                Fall Risk  No flowsheet data found.    OPIOID RISK TOOL  No flowsheet data found.    Coordination of Care:  1. Have you been to the ER, urgent care clinic since your last visit? no  Hospitalized since your last visit? no    2. Have you seen or consulted any other health care providers outside of the [facility] since your last visit? no Include any pap smears or colon screening. no

## 2024-02-08 NOTE — PROGRESS NOTES
[facility]    [address] [place] [address], [address]  [place] [address]  Phone: [phone]  Fax: [phone]        Bryon Mata  : 1965  PCP: Jael Arroyo MD    PROGRESS NOTE      ASSESSMENT AND PLAN    Bryon was seen today for back problem, pain and back pain.    Diagnoses and all orders for this visit:    Lumbar stenosis with neurogenic claudication    Epidural lipomatosis        Bryon Mata is a 58 y.o. male with stable stenosis.  Continue regimen of Cymbalta and gabapentin.  Sciatica has not recurred since spine injection.   Continue daily home exercise program as tolerated.  Does not need refills at this time.    Follow-up and Dispositions    Return in about 6 months (around 2024) for med fu, w/NP Meaghan.           HISTORY OF PRESENT ILLNESS    Bryon Mata is a 58 y.o. male presents for follow up of lower back pain. At his last OV (2023), patient reported some benefit with JOSE ALEJANDRO. Instructed to continue duloxetine QAM and Gabapentin QHS. Continue Tramadol through Dr. Arroyo.    The patient presents with intermittent lower back and sciatic pain. He describes being able to stand or walk for approximately 5-10 minutes before experiencing pain.    Patient reports adhering to medication regimen including Duloxentine 60 mg Qam, Gabapentin 300 mg at bedtime PRN, with benefit.     He is on long-term disability.  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             2024     1:02 PM   AMB PAIN ASSESSMENT   Location of Pain Back   Severity of Pain 5   Quality of Pain Throbbing   Duration of Pain Persistent   Frequency of Pain Constant   Aggravating Factors Walking;Bending   Limiting Behavior Yes   Relieving Factors Rest   Result of Injury Yes           Onset: 23 bending at work, felt pop and severe shooting LLE pain.  2023 started to have bilateral anterior thigh paresthesias with ambulation

## 2024-02-29 ENCOUNTER — TELEPHONE (OUTPATIENT)
Age: 59
End: 2024-02-29

## 2024-02-29 NOTE — TELEPHONE ENCOUNTER
Per your last OV note:  He is on long-term disability. 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

## 2024-02-29 NOTE — TELEPHONE ENCOUNTER
lAicia from [employer] is requesting a call back to about the patient works status.          Alicia from [employer]   [phone] ext [phone]

## 2024-02-29 NOTE — TELEPHONE ENCOUNTER
Attempted to contact Alicia regarding her message. She was not able to be reached. A message was left for Alicia letting her know Dr. Tran's response to her message. I asked her if they need additional information or need to talk with Dr. Tran. Our office number was provided.

## 2024-02-29 NOTE — TELEPHONE ENCOUNTER
Alicia from [facility] insurance returned called is states she is leaving for today but she is requesting a call back to get clarification on patient work status.      [phone] ext [phone]

## 2024-03-01 NOTE — TELEPHONE ENCOUNTER
I spoke to Alicia. She states that the last letter with restrictions has walking capacity of 30 minutes or less, zero lifting or carrying anything >20 lbs, and zero repetitive bending or twisting. She wants to know if these restrictions are still the same. They would also like to know if the pt is able to do sedentary activity. Their definition is - mostly sitting, may involve standing or walking for brief periods of time, lifting/carrying/pushing/pulling up to 10 lbs, occasionally changing in positions for brief periods of time, occasional talking. If this is something that the pt can do, the restrictions can be put in a new letter, verbal over the phone or done on a form. The message will be routed to the provider for further review. If a letter is done, I will fax it back to [phone] with claim #[id number]. She is aware that the provider will not be back in the office until Monday.

## 2024-03-01 NOTE — TELEPHONE ENCOUNTER
I tried calling Alicia again and wasn't able to reach her. Another message was left asking her what exactly she is asking for. Or if she just needs to speak directly to Dr. Tran. If so, she needs to provide a good time for her to call her. I did let her know that she will not be in the office until Monday. The number to the office was provided.

## 2024-03-04 NOTE — TELEPHONE ENCOUNTER
I called and spoke to Alicia. The pt was identified using 2 pt identifiers. She was notified of Dr. Tran's recommendations for activity. She was informed of same restrictions as previous and that it is ok for pt to do sedentary duty. Alicia verbalized understanding and states that she will document this in the pt's file. Nothing further needed at this time.

## 2024-03-26 ENCOUNTER — HOSPITAL ENCOUNTER (OUTPATIENT)
Facility: HOSPITAL | Age: 59
Discharge: HOME | End: 2024-03-29

## 2024-03-26 LAB — SENTARA SPECIMEN COLLECTION: NORMAL

## 2024-03-26 PROCEDURE — 99001 SPECIMEN HANDLING PT-LAB: CPT

## 2024-07-12 ENCOUNTER — HOSPITAL ENCOUNTER (OUTPATIENT)
Facility: HOSPITAL | Age: 59
Discharge: HOME | End: 2024-07-15

## 2024-07-12 PROCEDURE — 99001 SPECIMEN HANDLING PT-LAB: CPT

## 2024-07-16 ENCOUNTER — HOSPITAL ENCOUNTER (EMERGENCY)
Facility: HOSPITAL | Age: 59
Discharge: HOME | End: 2024-07-16
Payer: COMMERCIAL

## 2024-07-16 ENCOUNTER — APPOINTMENT (OUTPATIENT)
Facility: HOSPITAL | Age: 59
End: 2024-07-16
Payer: COMMERCIAL

## 2024-07-16 VITALS
HEIGHT: 73 IN | HEART RATE: 98 BPM | RESPIRATION RATE: 18 BRPM | DIASTOLIC BLOOD PRESSURE: 85 MMHG | OXYGEN SATURATION: 99 % | BODY MASS INDEX: 28.49 KG/M2 | WEIGHT: 215 LBS | SYSTOLIC BLOOD PRESSURE: 149 MMHG | TEMPERATURE: 98.9 F

## 2024-07-16 DIAGNOSIS — S62.316A CLOSED DISPLACED FRACTURE OF BASE OF FIFTH METACARPAL BONE OF RIGHT HAND, INITIAL ENCOUNTER: Primary | ICD-10-CM

## 2024-07-16 PROCEDURE — 73130 X-RAY EXAM OF HAND: CPT

## 2024-07-16 PROCEDURE — 29125 APPL SHORT ARM SPLINT STATIC: CPT

## 2024-07-16 PROCEDURE — 99283 EMERGENCY DEPT VISIT LOW MDM: CPT

## 2024-07-16 RX ORDER — TRAMADOL HYDROCHLORIDE 50 MG/1
50 TABLET ORAL EVERY 6 HOURS PRN
Qty: 6 TABLET | Refills: 0 | Status: SHIPPED | OUTPATIENT
Start: 2024-07-16 | End: 2024-07-21

## 2024-07-16 ASSESSMENT — LIFESTYLE VARIABLES
HOW MANY STANDARD DRINKS CONTAINING ALCOHOL DO YOU HAVE ON A TYPICAL DAY: 1 OR 2
HOW OFTEN DO YOU HAVE A DRINK CONTAINING ALCOHOL: MONTHLY OR LESS

## 2024-07-16 ASSESSMENT — PAIN - FUNCTIONAL ASSESSMENT
PAIN_FUNCTIONAL_ASSESSMENT: 0-10
PAIN_FUNCTIONAL_ASSESSMENT: 0-10

## 2024-07-16 ASSESSMENT — ENCOUNTER SYMPTOMS
DIARRHEA: 0
VOMITING: 0
COLOR CHANGE: 0
SHORTNESS OF BREATH: 0
ABDOMINAL PAIN: 0
RHINORRHEA: 0

## 2024-07-16 ASSESSMENT — PAIN SCALES - GENERAL
PAINLEVEL_OUTOF10: 6
PAINLEVEL_OUTOF10: 0

## 2024-07-16 ASSESSMENT — PAIN DESCRIPTION - LOCATION: LOCATION: HAND

## 2024-07-16 ASSESSMENT — PAIN DESCRIPTION - ORIENTATION: ORIENTATION: RIGHT

## 2024-07-16 NOTE — ED NOTES
Discharge instructions reviewed with patient. Patient advised to follow up as directed on discharge instructions.  Patient denies questions, needs or concerns at this time.  Patient verbalized understanding. No s/sx of distress noted.   Patient discharged by Provider  Patient ambulated with steady gait.

## 2024-07-16 NOTE — ED PROVIDER NOTES
EMERGENCY DEPARTMENT HISTORY AND PHYSICAL EXAM      Date: 7/16/2024  Patient Name: Bryon Mata    History of Presenting Illness     Chief Complaint   Patient presents with    Fall       History (Context): Bryon Mata is a 58 y.o. male with significant past medical history of htn, asthma presents ambulatory to the ED today. Patient reports 1 week ago he sustained a ground-level fall that resulted in him falling and hitting his right hand.  Denies hitting head and LOC.  Patient right-handed.  Patient reports pain continues stated on be evaluated.  Denies numbness, tingling, radiating pain, weakness. Sx worse with movement.       PCP: Jael Arroyo MD    No current facility-administered medications for this encounter.     Current Outpatient Medications   Medication Sig Dispense Refill    traMADol (ULTRAM) 50 MG tablet Take 1 tablet by mouth every 6 hours as needed for Pain for up to 5 days. Intended supply: 3 days. Take lowest dose possible to manage pain Max Daily Amount: 200 mg 6 tablet 0    traZODone (DESYREL) 100 MG tablet Take 1 tablet by mouth nightly      metoprolol succinate (TOPROL XL) 25 MG extended release tablet Take 1 tablet by mouth daily      gabapentin (NEURONTIN) 300 MG capsule Take 1 capsule by mouth nightly for 90 days. Max Daily Amount: 300 mg 30 capsule 2    amLODIPine (NORVASC) 10 MG tablet Take 1 tablet by mouth daily 30 tablet 3    magnesium oxide (MAG-OX) 400 (240 Mg) MG tablet Take 1 tablet by mouth daily 30 tablet 0    potassium chloride (KLOR-CON M) 20 MEQ extended release tablet 1 tablet twice a day for 1 week and then 1 tablet once a day.  Follow-up with primary care to adjust the dose. 60 tablet 0    DULoxetine (CYMBALTA) 60 MG extended release capsule Take 1 capsule by mouth daily 30 capsule 5    omeprazole (PRILOSEC) 20 MG delayed release capsule TAKE 1 CAPSULE BY MOUTH EVERY MORNING PRIOR TO BREAKFAST      zolpidem (AMBIEN) 10 MG tablet TAKE 1 TABLET BY MOUTH EVERY

## 2024-07-16 NOTE — ED TRIAGE NOTES
Patient presents to ER with pain to right hand after having pain from a ground level fall sustained last Thursday.

## 2024-07-17 ENCOUNTER — OFFICE VISIT (OUTPATIENT)
Age: 59
End: 2024-07-17

## 2024-07-17 VITALS — WEIGHT: 201 LBS | HEIGHT: 73 IN | BODY MASS INDEX: 26.64 KG/M2

## 2024-07-17 DIAGNOSIS — S62.346A CLOSED NONDISPLACED FRACTURE OF BASE OF FIFTH METACARPAL BONE OF RIGHT HAND, INITIAL ENCOUNTER: Primary | ICD-10-CM

## 2024-07-17 LAB — SENTARA SPECIMEN COLLECTION: NORMAL

## 2024-07-17 RX ORDER — DICLOFENAC SODIUM 75 MG/1
75 TABLET, DELAYED RELEASE ORAL 2 TIMES DAILY
Qty: 28 TABLET | Refills: 0 | Status: SHIPPED | OUTPATIENT
Start: 2024-07-17 | End: 2024-07-31

## 2024-07-17 NOTE — PROGRESS NOTES
Bryon Mata is a 58 y.o. male right handed.  Worker's Compensation and legal considerations: none    Chief Complaint   Patient presents with    Hand Pain     Right hand      Pain Score:   7    7/17/2024 initial HPI: Patient presents today with a history of an injury to his right hand.  He was diagnosed with 1/5 metacarpal base fracture.    Date of onset: Approximately 7/11/2024  Injury: Yes: Comment: Fall  Prior Treatment:  Yes: Comment: Splint    ROS: Review of Systems - General ROS: negative except HPI    Past Medical History:   Diagnosis Date    Asthma     Hypertension     Hypokalemia     Other ill-defined conditions(799.89)     Chronic back pain       Past Surgical History:   Procedure Laterality Date    HERNIA REPAIR      groin    ORTHOPEDIC SURGERY      Rt knee    ORTHOPEDIC SURGERY      Lt shoulder    WISDOM TOOTH EXTRACTION          Current Outpatient Medications   Medication Sig Dispense Refill    diclofenac (VOLTAREN) 75 MG EC tablet Take 1 tablet by mouth 2 times daily for 14 days 28 tablet 0    traMADol (ULTRAM) 50 MG tablet Take 1 tablet by mouth every 6 hours as needed for Pain for up to 5 days. Intended supply: 3 days. Take lowest dose possible to manage pain Max Daily Amount: 200 mg 6 tablet 0    traZODone (DESYREL) 100 MG tablet Take 1 tablet by mouth nightly      metoprolol succinate (TOPROL XL) 25 MG extended release tablet Take 1 tablet by mouth daily      amLODIPine (NORVASC) 10 MG tablet Take 1 tablet by mouth daily 30 tablet 3    magnesium oxide (MAG-OX) 400 (240 Mg) MG tablet Take 1 tablet by mouth daily 30 tablet 0    potassium chloride (KLOR-CON M) 20 MEQ extended release tablet 1 tablet twice a day for 1 week and then 1 tablet once a day.  Follow-up with primary care to adjust the dose. 60 tablet 0    DULoxetine (CYMBALTA) 60 MG extended release capsule Take 1 capsule by mouth daily 30 capsule 5    omeprazole (PRILOSEC) 20 MG delayed release capsule TAKE 1 CAPSULE BY MOUTH EVERY

## 2024-08-08 ENCOUNTER — OFFICE VISIT (OUTPATIENT)
Age: 59
End: 2024-08-08
Payer: COMMERCIAL

## 2024-08-08 VITALS
HEIGHT: 73 IN | HEART RATE: 89 BPM | WEIGHT: 200 LBS | OXYGEN SATURATION: 98 % | DIASTOLIC BLOOD PRESSURE: 79 MMHG | TEMPERATURE: 97.4 F | BODY MASS INDEX: 26.51 KG/M2 | SYSTOLIC BLOOD PRESSURE: 124 MMHG

## 2024-08-08 DIAGNOSIS — M48.062 LUMBAR STENOSIS WITH NEUROGENIC CLAUDICATION: Primary | ICD-10-CM

## 2024-08-08 DIAGNOSIS — M51.26 OTHER INTERVERTEBRAL DISC DISPLACEMENT, LUMBAR REGION: ICD-10-CM

## 2024-08-08 PROCEDURE — 99214 OFFICE O/P EST MOD 30 MIN: CPT | Performed by: NURSE PRACTITIONER

## 2024-08-08 RX ORDER — QUETIAPINE FUMARATE 50 MG/1
50 TABLET, FILM COATED ORAL NIGHTLY
COMMUNITY
Start: 2024-05-26

## 2024-08-08 RX ORDER — DULOXETIN HYDROCHLORIDE 60 MG/1
60 CAPSULE, DELAYED RELEASE ORAL DAILY
Qty: 30 CAPSULE | Refills: 5 | Status: SHIPPED | OUTPATIENT
Start: 2024-08-08

## 2024-08-08 NOTE — H&P (VIEW-ONLY)
Chief complaint   Chief Complaint   Patient presents with    Lower Back Pain       History of Present Illness:  Bryon Mata is a  58 y.o.  male who comes in today after last being seen by Dr. Tran on February 8, 2024.  He states he had an L3-4 epidural steroid injection September 26, 2023 which did help him quite a bit.  He had at least 70% improvement.  He was able to move better and doing more activities.  He states it is starting to wear off and he would like to have that repeated.  He is taking gabapentin 300 mg at bedtime but only periodically when his pain is bad.  He also takes Cymbalta 60 mg but is also only taking that as needed.  He states that works well for him.  He is diabetic his blood sugar check was about 130 in the chart.  He states he does not take her medications consistently because his PCP has him tramadol and that seems to help better for his back and left leg pain.  He denies fever bowel bladder dysfunction.      Physical Exam: Patient is a 58-year-old male well-developed well-nourished who is alert and oriented with a normal mood and affect.  He has a full weightbearing antalgic gait.  Not use any assist device.  He has 5 5 strength bilateral lower extremities.  Negative straight leg raise.  He has pain with hyperextension lumbar spine      Assessment and Plan:.  This is a patient who has lumbar spinal stenosis that gives him back and leg pain.  I will set him up for repeat L3-4 epidural steroid injection.  I refilled his Cymbalta.  I did explain to him that it works better if he takes it every day.  I will see him back after the block.    Bryon was seen today for lower back pain.    Diagnoses and all orders for this visit:    Lumbar stenosis with neurogenic claudication  -     DULoxetine (CYMBALTA) 60 MG extended release capsule; Take 1 capsule by mouth daily  -     Ambulatory Referral to Ortho Injection    Other intervertebral disc displacement, lumbar region  -     DULoxetine

## 2024-08-08 NOTE — PROGRESS NOTES
Bryon DE LA VEGA Adolfo presents today for   Chief Complaint   Patient presents with    Lower Back Pain       Is someone accompanying this pt? no    Is the patient using any DME equipment during OV? no    Coordination of Care:  1. Have you been to the ER, urgent care clinic since your last visit? Yes, pt went to the ER after he fell   Hospitalized since your last visit? no    2. Have you seen or consulted any other health care providers outside of the [facility] since your last visit? Yes, neurology and ortho Include any pap smears or colon screening. no      
918    Please note that this dictation was completed with Fest[employer], the computer voice recognition software.  Quite often unanticipated grammatical, syntax, homophones, and other interpretive errors are inadvertently transcribed by the computer software.  Please disregard these errors.  Please excuse any errors that have escaped final proofreading.     PAZ Lantigua - NP

## 2024-08-15 ENCOUNTER — OFFICE VISIT (OUTPATIENT)
Age: 59
End: 2024-08-15
Payer: COMMERCIAL

## 2024-08-15 VITALS — HEIGHT: 73 IN | WEIGHT: 200 LBS | BODY MASS INDEX: 26.51 KG/M2

## 2024-08-15 DIAGNOSIS — S62.346D CLOSED NONDISPLACED FRACTURE OF BASE OF FIFTH METACARPAL BONE OF RIGHT HAND WITH ROUTINE HEALING, SUBSEQUENT ENCOUNTER: Primary | ICD-10-CM

## 2024-08-15 PROCEDURE — 99024 POSTOP FOLLOW-UP VISIT: CPT

## 2024-08-15 PROCEDURE — 73130 X-RAY EXAM OF HAND: CPT

## 2024-08-15 NOTE — PROGRESS NOTES
zolpidem (AMBIEN) 10 MG tablet TAKE 1 TABLET BY MOUTH EVERY NIGHT AT BEDTIME AS NEEDED FOR SLEEP      albuterol sulfate HFA (PROVENTIL;VENTOLIN;PROAIR) 108 (90 Base) MCG/ACT inhaler Inhale 2 puffs into the lungs every 4 hours as needed for Wheezing or Shortness of Breath      FARXIGA 10 MG tablet TAKE 1 TABLET BY MOUTH ONCE DAILY      ergocalciferol (ERGOCALCIFEROL) 1.25 MG (30553 UT) capsule Take 1 capsule by mouth every 7 days      rosuvastatin (CRESTOR) 20 MG tablet Take 1 tablet by mouth daily      diclofenac (VOLTAREN) 75 MG EC tablet Take 1 tablet by mouth 2 times daily for 14 days 28 tablet 0    gabapentin (NEURONTIN) 300 MG capsule Take 1 capsule by mouth nightly for 90 days. Max Daily Amount: 300 mg 30 capsule 2     No current facility-administered medications for this visit.       Allergies   Allergen Reactions    Penicillins Hives         Ht 1.854 m (6' 1\")   Wt 90.7 kg (200 lb)   BMI 26.39 kg/m²   Physical Exam  Constitutional:       General: He is not in acute distress.     Appearance: Normal appearance. He is not ill-appearing.   Cardiovascular:      Pulses: Normal pulses.   Pulmonary:      Effort: Pulmonary effort is normal. No respiratory distress.   Abdominal:      General: Abdomen is flat. There is no distension.   Musculoskeletal:         General: Swelling and tenderness present. No deformity or signs of injury.      Cervical back: Normal range of motion and neck supple.      Right lower leg: No edema.      Left lower leg: No edema.   Skin:     General: Skin is warm and dry.      Capillary Refill: Capillary refill takes less than 2 seconds.      Findings: No bruising or erythema.   Neurological:      General: No focal deficit present.      Mental Status: He is alert and oriented to person, place, and time.   Psychiatric:         Mood and Affect: Mood normal.         Behavior: Behavior normal.          Right hand: There is tenderness and mild residual edema noted on the ulnar aspect of the

## 2024-08-20 ENCOUNTER — TRANSCRIBE ORDERS (OUTPATIENT)
Facility: HOSPITAL | Age: 59
End: 2024-08-20

## 2024-08-20 ENCOUNTER — HOSPITAL ENCOUNTER (OUTPATIENT)
Facility: HOSPITAL | Age: 59
Discharge: HOME | End: 2024-08-23

## 2024-08-20 DIAGNOSIS — E28.2 POLYCYSTIC OVARIES: ICD-10-CM

## 2024-08-20 DIAGNOSIS — F10.20 ACUTE ALCOHOLISM (HCC): ICD-10-CM

## 2024-08-20 DIAGNOSIS — I10 ESSENTIAL HYPERTENSION, MALIGNANT: ICD-10-CM

## 2024-08-20 DIAGNOSIS — I10 ESSENTIAL HYPERTENSION, MALIGNANT: Primary | ICD-10-CM

## 2024-08-20 LAB — SENTARA SPECIMEN COLLECTION: NORMAL

## 2024-08-20 PROCEDURE — 99001 SPECIMEN HANDLING PT-LAB: CPT

## 2024-09-03 ENCOUNTER — HOSPITAL ENCOUNTER (OUTPATIENT)
Facility: HOSPITAL | Age: 59
Discharge: HOME | End: 2024-09-06
Payer: COMMERCIAL

## 2024-09-03 VITALS
RESPIRATION RATE: 16 BRPM | TEMPERATURE: 98.2 F | SYSTOLIC BLOOD PRESSURE: 138 MMHG | DIASTOLIC BLOOD PRESSURE: 86 MMHG | HEART RATE: 83 BPM | OXYGEN SATURATION: 95 %

## 2024-09-03 LAB — GLUCOSE BLD STRIP.AUTO-MCNC: 177 MG/DL (ref 70–110)

## 2024-09-03 PROCEDURE — 6360000002 HC RX W HCPCS: Performed by: PHYSICAL MEDICINE & REHABILITATION

## 2024-09-03 PROCEDURE — 82962 GLUCOSE BLOOD TEST: CPT

## 2024-09-03 PROCEDURE — 62323 NJX INTERLAMINAR LMBR/SAC: CPT | Performed by: PHYSICAL MEDICINE & REHABILITATION

## 2024-09-03 PROCEDURE — 2500000003 HC RX 250 WO HCPCS: Performed by: PHYSICAL MEDICINE & REHABILITATION

## 2024-09-03 PROCEDURE — 6370000000 HC RX 637 (ALT 250 FOR IP): Performed by: PHYSICAL MEDICINE & REHABILITATION

## 2024-09-03 PROCEDURE — 62323 NJX INTERLAMINAR LMBR/SAC: CPT

## 2024-09-03 PROCEDURE — 6360000004 HC RX CONTRAST MEDICATION: Performed by: PHYSICAL MEDICINE & REHABILITATION

## 2024-09-03 RX ORDER — DIAZEPAM 5 MG
5 TABLET ORAL ONCE
Status: COMPLETED | OUTPATIENT
Start: 2024-09-03 | End: 2024-09-03

## 2024-09-03 RX ORDER — IOPAMIDOL 408 MG/ML
4 INJECTION, SOLUTION INTRATHECAL
Status: COMPLETED | OUTPATIENT
Start: 2024-09-03 | End: 2024-09-03

## 2024-09-03 RX ORDER — DIAZEPAM 5 MG
2.5 TABLET ORAL ONCE
Status: COMPLETED | OUTPATIENT
Start: 2024-09-03 | End: 2024-09-03

## 2024-09-03 RX ORDER — DEXAMETHASONE SODIUM PHOSPHATE 10 MG/ML
10 INJECTION, SOLUTION INTRAMUSCULAR; INTRAVENOUS ONCE
Status: COMPLETED | OUTPATIENT
Start: 2024-09-03 | End: 2024-09-03

## 2024-09-03 RX ORDER — LIDOCAINE HYDROCHLORIDE 10 MG/ML
30 INJECTION, SOLUTION EPIDURAL; INFILTRATION; INTRACAUDAL; PERINEURAL ONCE
Status: COMPLETED | OUTPATIENT
Start: 2024-09-03 | End: 2024-09-03

## 2024-09-03 RX ORDER — DIAZEPAM 5 MG
10 TABLET ORAL ONCE
Status: COMPLETED | OUTPATIENT
Start: 2024-09-03 | End: 2024-09-03

## 2024-09-03 RX ADMIN — DIAZEPAM 10 MG: 5 TABLET ORAL at 10:31

## 2024-09-03 RX ADMIN — IOPAMIDOL 2 ML: 408 INJECTION, SOLUTION INTRATHECAL at 11:31

## 2024-09-03 RX ADMIN — DEXAMETHASONE SODIUM PHOSPHATE 10 MG: 10 INJECTION, SOLUTION INTRAMUSCULAR; INTRAVENOUS at 11:31

## 2024-09-03 RX ADMIN — LIDOCAINE HYDROCHLORIDE 15 ML: 10 INJECTION, SOLUTION EPIDURAL; INFILTRATION; INTRACAUDAL; PERINEURAL at 11:26

## 2024-09-03 ASSESSMENT — PAIN DESCRIPTION - DESCRIPTORS: DESCRIPTORS: ACHING

## 2024-09-03 ASSESSMENT — PAIN SCALES - GENERAL: PAINLEVEL_OUTOF10: 0

## 2024-09-03 ASSESSMENT — PAIN - FUNCTIONAL ASSESSMENT: PAIN_FUNCTIONAL_ASSESSMENT: 0-10

## 2024-09-03 NOTE — PROCEDURES
PROCEDURE NOTE  Date: 9/3/2024   Name: Bryon Mata  YOB: 1965    Procedures        Intralaminar Epidural Steroid Procedure Note        Patient Name   Bryon Mata  Date of Procedure: September 3, 2024  Preoperative Diagnosis: Lumbar spinal stenosis with claudication  Postoperative Diagnosis: Same  Location [facility], [place], [place]      Procedure:  Epidural Steroid Injection    Consent:  Informed consent was obtained prior to the procedure.  In addition to the potential risks associated with the procedure itself, the patient was informed both verbally and in writing of the potential side effects of the use of glucocorticoid.  The patient appeared to comprehend the informed consent and desired to have the procedure performed.      Procedure in Detail:  The patient was taken to the procedure suite and placed in the prone position on the operating table on appropriate padding. The posterior lumbar region was prepped and draped in the usual sterile fashion. Intraoperative fluoroscopy was used to localize the L3-L4 interspace. The skin was infiltrated with 1% lidocaine. An 18-gauge standard spinal Tuohy needle was advanced into the epidural space at L3-L4 under fluoroscopic guidance using the loss of resistance technique. No cerebrospinal fluid was seen throughout the procedure.     Yes  A small amount of Isovue was injected into the epidural space, confirming appropriated needle placement on fluoroscopy. No vascular uptake was identified.    Next, 2ml of 1% Lidocaine and 10mg of preservative free Dexamethasone were injected via the Tuohy needle. The needle was removed from the patient.     The patient tolerated the procedure well and was discharged home with designated  and care instructions. Denies headache. No bleeding from injection site. Hip flexor strength intact. Straight leg raise negative. Patient reported kathleen-procedural pain on

## 2024-09-03 NOTE — INTERVAL H&P NOTE
Update History & Physical    The patient's History and Physical of August 8, 2024 was reviewed. There was no change. The surgical site was confirmed by the patient and me.     Plan: The risks, benefits, expected outcome, and alternative to the recommended procedure have been discussed with the patient. Patient understands and wants to proceed with the procedure.     Electronically signed by CHRISTOPHER MEJIA MD on 9/3/2024 at 11:17 AM

## 2024-11-20 ENCOUNTER — HOSPITAL ENCOUNTER (OUTPATIENT)
Facility: HOSPITAL | Age: 59
Setting detail: SPECIMEN
Discharge: HOME | End: 2024-11-23

## 2024-11-20 LAB — SENTARA SPECIMEN COLLECTION: NORMAL

## 2024-11-20 PROCEDURE — 99001 SPECIMEN HANDLING PT-LAB: CPT

## 2024-11-21 ENCOUNTER — OFFICE VISIT (OUTPATIENT)
Age: 59
End: 2024-11-21

## 2024-11-21 VITALS
BODY MASS INDEX: 25.45 KG/M2 | DIASTOLIC BLOOD PRESSURE: 82 MMHG | WEIGHT: 192 LBS | HEIGHT: 73 IN | SYSTOLIC BLOOD PRESSURE: 144 MMHG | HEART RATE: 96 BPM | OXYGEN SATURATION: 99 %

## 2024-11-21 DIAGNOSIS — R55 SYNCOPE, UNSPECIFIED SYNCOPE TYPE: ICD-10-CM

## 2024-11-21 DIAGNOSIS — M47.812 CERVICAL SPONDYLOSIS: ICD-10-CM

## 2024-11-21 DIAGNOSIS — R26.89 ABNORMALITY OF GAIT DUE TO IMPAIRMENT OF BALANCE: ICD-10-CM

## 2024-11-21 DIAGNOSIS — F17.210 CIGARETTE NICOTINE DEPENDENCE WITHOUT COMPLICATION: ICD-10-CM

## 2024-11-21 DIAGNOSIS — R20.0 BILATERAL HAND NUMBNESS: ICD-10-CM

## 2024-11-21 DIAGNOSIS — G47.33 OSA (OBSTRUCTIVE SLEEP APNEA): ICD-10-CM

## 2024-11-21 DIAGNOSIS — I63.09 CEREBROVASCULAR ACCIDENT (CVA) DUE TO THROMBOSIS OF OTHER PRECEREBRAL ARTERY (HCC): ICD-10-CM

## 2024-11-21 DIAGNOSIS — I63.09 CEREBROVASCULAR ACCIDENT (CVA) DUE TO THROMBOSIS OF OTHER PRECEREBRAL ARTERY (HCC): Primary | ICD-10-CM

## 2024-11-21 RX ORDER — ASPIRIN 81 MG/1
81 TABLET ORAL DAILY
Qty: 90 TABLET | Refills: 0 | Status: SHIPPED | OUTPATIENT
Start: 2024-11-21

## 2024-11-21 RX ORDER — NICOTINE 21 MG/24HR
1 PATCH, TRANSDERMAL 24 HOURS TRANSDERMAL DAILY
Qty: 42 PATCH | Refills: 0 | Status: SHIPPED | OUTPATIENT
Start: 2024-11-21 | End: 2025-01-02

## 2024-11-21 ASSESSMENT — PATIENT HEALTH QUESTIONNAIRE - PHQ9
SUM OF ALL RESPONSES TO PHQ QUESTIONS 1-9: 0
1. LITTLE INTEREST OR PLEASURE IN DOING THINGS: NOT AT ALL
SUM OF ALL RESPONSES TO PHQ QUESTIONS 1-9: 0
2. FEELING DOWN, DEPRESSED OR HOPELESS: NOT AT ALL
SUM OF ALL RESPONSES TO PHQ QUESTIONS 1-9: 0
SUM OF ALL RESPONSES TO PHQ QUESTIONS 1-9: 0
SUM OF ALL RESPONSES TO PHQ9 QUESTIONS 1 & 2: 0

## 2024-11-21 NOTE — PATIENT INSTRUCTIONS
Start taking a baby aspirin every day.  You will have an MRI of your brain and neck.  You will have an ultrasound of the carotid arteries.  You will wear a heart monitor for seven days.  Stop at the lab for bloodwork when you go for the MRIs.  You will do physical therapy for the left hand weakness and balance difficulties.  You will  have a nerve test of both of hands to look for carpal tunnel syndrome.

## 2024-11-21 NOTE — PROGRESS NOTES
SUBJECTIVE:   Bryon Mata is a 59 y.o. male seen regarding the following:     Chief Complaint   Patient presents with    New Patient     PT presents to establish care.  PT presents with complaints of numbness in the left hand/forearm.          HPI:  The patient presents for evaluation of left arm numbness. He had an  episode of hypokalemia and since then he has had numbness in the left hand. His left hand locks up. He also feels weak in the left hand. No neck pain.  Seen 12/2022 for hypokalemia, elevated troponin ct head showed chronic right lacunar infarct. Hospitalized July 20223-severe hypokalemia, low magnesium, acute kidney injury. Improved with IV fluids. He had a syncopal event.  Echo unrevealing. CT head then  showed chronic right centrum and right putamen stroke . The right centrum semiovale stroke was new from the prev ct head in 3/2023.No neck pain. No numbness in right hand.      Past Medical History, Past Surgical History, Family history, Social History, and Medications were all reviewed with the patient today and updated as necessary.     Current Outpatient Medications   Medication Sig Dispense Refill    QUEtiapine (SEROQUEL) 50 MG tablet Take 1 tablet by mouth nightly      DULoxetine (CYMBALTA) 60 MG extended release capsule Take 1 capsule by mouth daily 30 capsule 5    traZODone (DESYREL) 100 MG tablet Take 1 tablet by mouth nightly      metoprolol succinate (TOPROL XL) 25 MG extended release tablet Take 1 tablet by mouth daily      gabapentin (NEURONTIN) 300 MG capsule Take 1 capsule by mouth nightly for 90 days. Max Daily Amount: 300 mg 30 capsule 2    amLODIPine (NORVASC) 10 MG tablet Take 1 tablet by mouth daily 30 tablet 3    magnesium oxide (MAG-OX) 400 (240 Mg) MG tablet Take 1 tablet by mouth daily 30 tablet 0    potassium chloride (KLOR-CON M) 20 MEQ extended release tablet 1 tablet twice a day for 1 week and then 1 tablet once a day.  Follow-up with primary care to adjust the dose.

## 2024-12-13 ENCOUNTER — HOSPITAL ENCOUNTER (OUTPATIENT)
Facility: HOSPITAL | Age: 59
Setting detail: SPECIMEN
Discharge: HOME | End: 2024-12-16

## 2024-12-13 LAB — SENTARA SPECIMEN COLLECTION: NORMAL

## 2024-12-13 PROCEDURE — 99001 SPECIMEN HANDLING PT-LAB: CPT

## 2024-12-16 LAB
ESTIMATED AVERAGE GLUCOSE: 138 MG/DL (ref 91–123)
HBA1C MFR BLD: 6.4 % (ref 4.8–5.6)
VITAMIN B-12: 629 PG/ML (ref 211–911)

## 2024-12-31 ENCOUNTER — OFFICE VISIT (OUTPATIENT)
Age: 59
End: 2024-12-31

## 2024-12-31 VITALS
WEIGHT: 184 LBS | BODY MASS INDEX: 24.39 KG/M2 | HEART RATE: 99 BPM | HEIGHT: 73 IN | SYSTOLIC BLOOD PRESSURE: 121 MMHG | TEMPERATURE: 97.6 F | DIASTOLIC BLOOD PRESSURE: 81 MMHG | OXYGEN SATURATION: 100 %

## 2024-12-31 DIAGNOSIS — M47.812 CERVICAL SPONDYLOSIS: Primary | ICD-10-CM

## 2024-12-31 DIAGNOSIS — G56.22 NEUROPATHY OF LEFT ULNAR NERVE AT WRIST: ICD-10-CM

## 2024-12-31 RX ORDER — TRAMADOL HYDROCHLORIDE 50 MG/1
50 TABLET ORAL EVERY 6 HOURS PRN
COMMUNITY
Start: 2024-11-21

## 2024-12-31 RX ORDER — MONTELUKAST SODIUM 10 MG/1
10 TABLET ORAL DAILY
COMMUNITY
Start: 2024-11-21

## 2025-02-21 ENCOUNTER — HOSPITAL ENCOUNTER (OUTPATIENT)
Facility: HOSPITAL | Age: 60
Discharge: HOME | End: 2025-02-24
Attending: PSYCHIATRY & NEUROLOGY
Payer: COMMERCIAL

## 2025-02-21 DIAGNOSIS — I63.09 CEREBROVASCULAR ACCIDENT (CVA) DUE TO THROMBOSIS OF OTHER PRECEREBRAL ARTERY (HCC): ICD-10-CM

## 2025-02-21 DIAGNOSIS — G47.33 OSA (OBSTRUCTIVE SLEEP APNEA): ICD-10-CM

## 2025-02-21 DIAGNOSIS — R20.0 BILATERAL HAND NUMBNESS: ICD-10-CM

## 2025-02-21 DIAGNOSIS — M47.812 CERVICAL SPONDYLOSIS: ICD-10-CM

## 2025-02-21 DIAGNOSIS — R26.89 ABNORMALITY OF GAIT DUE TO IMPAIRMENT OF BALANCE: ICD-10-CM

## 2025-02-21 DIAGNOSIS — R55 SYNCOPE, UNSPECIFIED SYNCOPE TYPE: ICD-10-CM

## 2025-02-21 PROCEDURE — 72141 MRI NECK SPINE W/O DYE: CPT

## 2025-02-21 PROCEDURE — 70551 MRI BRAIN STEM W/O DYE: CPT

## 2025-03-07 ENCOUNTER — TELEPHONE (OUTPATIENT)
Age: 60
End: 2025-03-07

## 2025-03-07 NOTE — TELEPHONE ENCOUNTER
Attempted to called pt regarding MRI results, left message to return call to our office.      ----- Message from Dr. Chaparrita Miranda MD sent at 3/7/2025 10:00 AM EST -----  Please inform patient that his MRI brain shows that he has had some tiny strokes in the past.  It is possible that some of them could contribute to the hand numbness.  He also has Significant bulging disc and bone spurs in the neck that are pinching nerves.  That can contribute to hand numbness as well.  He should continue to take aspirin rosuvastatin monitor his blood pressure healthy diet and continue with the workup as is already pending and keep his follow-up visit with me

## 2025-03-19 PROBLEM — F11.90 CHRONIC NARCOTIC USE: Status: ACTIVE | Noted: 2025-03-19

## 2025-03-19 PROBLEM — Z86.73 HISTORY OF STROKE: Status: ACTIVE | Noted: 2025-03-19

## 2025-03-20 PROBLEM — I63.9 ACUTE CVA (CEREBROVASCULAR ACCIDENT) (HCC): Status: ACTIVE | Noted: 2025-03-20

## 2025-03-24 PROBLEM — I63.231: Status: ACTIVE | Noted: 2025-03-20

## 2025-03-27 ENCOUNTER — TELEPHONE (OUTPATIENT)
Age: 60
End: 2025-03-27

## 2025-03-27 NOTE — TELEPHONE ENCOUNTER
Patient called in stating he was just in the hospital for a stroke on 3/20. He called in to schedule a follow up but none were available until 5/1. If provider would like to see him sooner please advise.

## 2025-03-28 NOTE — PROGRESS NOTES
[facility]   Sleep Medicine     Office Progress Note - Initial Evaluation        [address]  [address]  [facility] [address] [phone] Fax    Reason for visit/referral: Evaluation for possible obstructive sleep apnea/sleep disordered breathing  Assessment:      1. Suspected sleep apnea  -     Polysomnography (80071); Future  2. Loud snoring  3. Inadequate sleep hygiene  Assessment & Plan:   No set sleep/wake schedule but wondering if he \"cannot sleep\" due to several factors to include having ongoing moderate to severe obstructive sleep apnea.  Once we start managing this, perhaps the quality of his sleep and schedule start to improve.  Also, he is taking trazodone and Ambien which would not be a great idea if he has ongoing untreated moderate to severe obstructive sleep apnea but I also think you will be able to sleep at all if he stops taking his medications abruptly.  4. Insomnia w/ sleep apnea  5. Chronic narcotic use  Assessment & Plan:   Monitored by specialist- no acute findings meriting change in the plan  Was taking tramadol daily and recently Roxicodone was added to this regimen.  This would put him at higher risk for sleep-related hypoventilation and also could potentially be a sleep disrupter for him.    6. History of stroke  Assessment & Plan:   The patient stated today that he has had \"mini strokes\" but then had an actual stroke March 30, 2025.  He has left arm weakness and some weakness on the left side of his face but otherwise is quite ambulatory.  Neurology is managing.  7. Essential hypertension  Assessment & Plan:   Chronic, not at goal (unstable), continue current treatment plan, blood pressure 162/94 today, asymptomatic,  8. Mechanical low back pain  Assessment & Plan:   Taking tramadol and Roxicodone  9. Tobacco abuse  Assessment & Plan:   Just quit smoking last week     Mr. Mata is a 59 year old male with a history of stroke (3/20/25) ,

## 2025-03-31 ENCOUNTER — OFFICE VISIT (OUTPATIENT)
Age: 60
End: 2025-03-31
Payer: COMMERCIAL

## 2025-03-31 VITALS
WEIGHT: 185 LBS | OXYGEN SATURATION: 97 % | HEART RATE: 93 BPM | DIASTOLIC BLOOD PRESSURE: 94 MMHG | TEMPERATURE: 97.8 F | RESPIRATION RATE: 18 BRPM | BODY MASS INDEX: 24.52 KG/M2 | HEIGHT: 73 IN | SYSTOLIC BLOOD PRESSURE: 162 MMHG

## 2025-03-31 DIAGNOSIS — Z72.0 TOBACCO ABUSE: ICD-10-CM

## 2025-03-31 DIAGNOSIS — G47.30 INSOMNIA W/ SLEEP APNEA: ICD-10-CM

## 2025-03-31 DIAGNOSIS — I10 ESSENTIAL HYPERTENSION: ICD-10-CM

## 2025-03-31 DIAGNOSIS — R06.83 LOUD SNORING: ICD-10-CM

## 2025-03-31 DIAGNOSIS — M54.59 MECHANICAL LOW BACK PAIN: ICD-10-CM

## 2025-03-31 DIAGNOSIS — Z72.821 INADEQUATE SLEEP HYGIENE: ICD-10-CM

## 2025-03-31 DIAGNOSIS — G47.00 INSOMNIA W/ SLEEP APNEA: ICD-10-CM

## 2025-03-31 DIAGNOSIS — F11.90 CHRONIC NARCOTIC USE: ICD-10-CM

## 2025-03-31 DIAGNOSIS — R29.818 SUSPECTED SLEEP APNEA: Primary | ICD-10-CM

## 2025-03-31 DIAGNOSIS — Z86.73 HISTORY OF STROKE: ICD-10-CM

## 2025-03-31 PROCEDURE — 3080F DIAST BP >= 90 MM HG: CPT | Performed by: OTOLARYNGOLOGY

## 2025-03-31 PROCEDURE — 3077F SYST BP >= 140 MM HG: CPT | Performed by: OTOLARYNGOLOGY

## 2025-03-31 PROCEDURE — 99204 OFFICE O/P NEW MOD 45 MIN: CPT | Performed by: OTOLARYNGOLOGY

## 2025-03-31 ASSESSMENT — SLEEP AND FATIGUE QUESTIONNAIRES
HOW LIKELY ARE YOU TO NOD OFF OR FALL ASLEEP WHILE WATCHING TV: MODERATE CHANCE OF DOZING
ESS TOTAL SCORE: 9
HOW LIKELY ARE YOU TO NOD OFF OR FALL ASLEEP IN A CAR, WHILE STOPPED FOR A FEW MINUTES IN TRAFFIC: SLIGHT CHANCE OF DOZING
HOW LIKELY ARE YOU TO NOD OFF OR FALL ASLEEP WHILE LYING DOWN TO REST IN THE AFTERNOON WHEN CIRCUMSTANCES PERMIT: MODERATE CHANCE OF DOZING
HOW LIKELY ARE YOU TO NOD OFF OR FALL ASLEEP WHILE SITTING INACTIVE IN A PUBLIC PLACE: SLIGHT CHANCE OF DOZING
HOW LIKELY ARE YOU TO NOD OFF OR FALL ASLEEP WHILE SITTING QUIETLY AFTER LUNCH WITHOUT ALCOHOL: WOULD NEVER DOZE
HOW LIKELY ARE YOU TO NOD OFF OR FALL ASLEEP WHILE SITTING AND READING: SLIGHT CHANCE OF DOZING
HOW LIKELY ARE YOU TO NOD OFF OR FALL ASLEEP WHILE SITTING AND TALKING TO SOMEONE: WOULD NEVER DOZE
HOW LIKELY ARE YOU TO NOD OFF OR FALL ASLEEP WHEN YOU ARE A PASSENGER IN A CAR FOR AN HOUR WITHOUT A BREAK: MODERATE CHANCE OF DOZING

## 2025-03-31 NOTE — PROGRESS NOTES
Bryon Mata presents today for   Chief Complaint   Patient presents with    New Patient     Cerebrovascular accident ( CVA) due to thrombosis of other precerebral artery       Is someone accompanying this pt? No    Is the patient using any DME equipment during OV? [employer] No    Depression Screenin/21/2024    11:44 AM   PHQ-9 Questionaire   Little interest or pleasure in doing things 0   Feeling down, depressed, or hopeless 0   PHQ-9 Total Score 0       Learning Assessment:    Failed to redirect to the Timeline version of the [employer] SmartLink.    Abuse Screening:         No data to display                Fall Risk    Failed to redirect to the Timeline version of the [employer] SmartLink.    Coordination of Care:    1. Have you been to the ER, urgent care clinic since your last visit? Hospitalized since your last visit? No    2. Have you seen or consulted any other health care providers outside of the [facility] since your last visit? Include any pap smears or colon screening. No    Medication list has been update per patient.

## 2025-03-31 NOTE — PATIENT INSTRUCTIONS
Please make a follow up appointment to discuss the results of your sleep study or I will send you a \"my chart\" message with your results and treatment options.     The [facility] is located in the [facility], adjacent to [facility]. The lab is on the second floor. The direct number to call for sleep study related questions is: [phone].    Please call our clinic back at [phone] or send a message on [employer] if you have any questions or concerns or if you are experiencing any of the following:     You have not received a follow up appointment within 30 days prior the recommended follow up time.    If you are not tolerating treatment plan and/or not able to obtain equipment or prescribed medication(s).  if you are experiencing any difficulties with the Durable Medical Equipment  (DME) Company you may be using or is assigned to you.  Two weeks have passed and you have not received an appointment for a scheduled procedure.  Two weeks have passed since you underwent a test and/or procedure and you have not received your results.  Your  Durable Medical Equipment (DME ) company is supposed to provide you with replacement filters, tubing and masks. You can either call your DME when you need new supplies or you can arrange for an automatic shipment schedule.    Your need to be seen by our office at lat minimum of every 12 months in order to renew the prescription for these supplies.   Please make note of who your DME company is and their phone number.   Please make sure that you clean your mask and hosing on a regular basis.  Your DME can provide you with additional information regarding proper care and cleaning of your device     Safety is strongly encouraged.  You should not drive if sleepy, tired, distracted and/or fatigued.      Chest Xrays and blood work do not require appointments.  They are considered \"Walk-In\" services and can be obtained at either [facility] or [facility]

## 2025-03-31 NOTE — ASSESSMENT & PLAN NOTE
Monitored by specialist- no acute findings meriting change in the plan  Was taking tramadol daily and recently Roxicodone was added to this regimen.  This would put him at higher risk for sleep-related hypoventilation and also could potentially be a sleep disrupter for him.

## 2025-03-31 NOTE — ASSESSMENT & PLAN NOTE
Chronic, not at goal (unstable), continue current treatment plan, blood pressure 162/94 today, asymptomatic,

## 2025-03-31 NOTE — ASSESSMENT & PLAN NOTE
No set sleep/wake schedule but wondering if he \"cannot sleep\" due to several factors to include having ongoing moderate to severe obstructive sleep apnea.  Once we start managing this, perhaps the quality of his sleep and schedule start to improve.  Also, he is taking trazodone and Ambien which would not be a great idea if he has ongoing untreated moderate to severe obstructive sleep apnea but I also think you will be able to sleep at all if he stops taking his medications abruptly.

## 2025-03-31 NOTE — ASSESSMENT & PLAN NOTE
The patient stated today that he has had \"mini strokes\" but then had an actual stroke March 30, 2025.  He has left arm weakness and some weakness on the left side of his face but otherwise is quite ambulatory.  Neurology is managing.

## 2025-05-01 ENCOUNTER — OFFICE VISIT (OUTPATIENT)
Age: 60
End: 2025-05-01
Payer: MEDICARE

## 2025-05-01 VITALS
HEART RATE: 74 BPM | DIASTOLIC BLOOD PRESSURE: 85 MMHG | WEIGHT: 187 LBS | OXYGEN SATURATION: 100 % | TEMPERATURE: 97.5 F | BODY MASS INDEX: 24.78 KG/M2 | HEIGHT: 73 IN | SYSTOLIC BLOOD PRESSURE: 148 MMHG

## 2025-05-01 DIAGNOSIS — R20.0 BILATERAL HAND NUMBNESS: ICD-10-CM

## 2025-05-01 DIAGNOSIS — G47.33 OSA (OBSTRUCTIVE SLEEP APNEA): ICD-10-CM

## 2025-05-01 DIAGNOSIS — R55 SYNCOPE, UNSPECIFIED SYNCOPE TYPE: ICD-10-CM

## 2025-05-01 DIAGNOSIS — I63.09 CEREBROVASCULAR ACCIDENT (CVA) DUE TO THROMBOSIS OF OTHER PRECEREBRAL ARTERY (HCC): Primary | ICD-10-CM

## 2025-05-01 DIAGNOSIS — M47.812 CERVICAL SPONDYLOSIS: ICD-10-CM

## 2025-05-01 DIAGNOSIS — R26.89 ABNORMALITY OF GAIT DUE TO IMPAIRMENT OF BALANCE: ICD-10-CM

## 2025-05-01 DIAGNOSIS — F17.210 CIGARETTE NICOTINE DEPENDENCE WITHOUT COMPLICATION: ICD-10-CM

## 2025-05-01 DIAGNOSIS — G56.22 NEUROPATHY OF LEFT ULNAR NERVE AT WRIST: ICD-10-CM

## 2025-05-01 PROCEDURE — 3077F SYST BP >= 140 MM HG: CPT | Performed by: PSYCHIATRY & NEUROLOGY

## 2025-05-01 PROCEDURE — 3079F DIAST BP 80-89 MM HG: CPT | Performed by: PSYCHIATRY & NEUROLOGY

## 2025-05-01 PROCEDURE — G2211 COMPLEX E/M VISIT ADD ON: HCPCS | Performed by: PSYCHIATRY & NEUROLOGY

## 2025-05-01 PROCEDURE — 99215 OFFICE O/P EST HI 40 MIN: CPT | Performed by: PSYCHIATRY & NEUROLOGY

## 2025-05-01 RX ORDER — CLOPIDOGREL BISULFATE 75 MG/1
75 TABLET ORAL DAILY
Qty: 30 TABLET | Refills: 3 | Status: SHIPPED | OUTPATIENT
Start: 2025-05-01 | End: 2025-05-31

## 2025-05-01 NOTE — PROGRESS NOTES
SUBJECTIVE:   Bryon Mata is a 59 y.o. male seen for a follow up visit regarding the following:     Chief Complaint   Patient presents with    Follow-up     CVA 3/20/25, Cervical spondylosis       HPI:  He returns today for follow up. Patient suffered a stroke 3/2025-left face and arm numbness.  Found to have bilateral ICA stenosis, s/p right carotid stent.  Doing better.  Still with some residual left face arm/numbness. No weakness.  Pending sleep study.           Past Medical History, Past Surgical History, Family history, Social History, and Medications were all reviewed with the patient today and updated as necessary.     Current Outpatient Medications   Medication Sig Dispense Refill    montelukast (SINGULAIR) 10 MG tablet Take 1 tablet by mouth daily      aspirin 81 MG EC tablet Take 1 tablet by mouth daily 90 tablet 0    DULoxetine (CYMBALTA) 60 MG extended release capsule Take 1 capsule by mouth daily 30 capsule 5    traZODone (DESYREL) 100 MG tablet Take 1 tablet by mouth nightly      metoprolol succinate (TOPROL XL) 25 MG extended release tablet Take 1 tablet by mouth daily      amLODIPine (NORVASC) 10 MG tablet Take 1 tablet by mouth daily 30 tablet 3    magnesium oxide (MAG-OX) 400 (240 Mg) MG tablet Take 1 tablet by mouth daily 30 tablet 0    omeprazole (PRILOSEC) 20 MG delayed release capsule TAKE 1 CAPSULE BY MOUTH EVERY MORNING PRIOR TO BREAKFAST      albuterol sulfate HFA (PROVENTIL;VENTOLIN;PROAIR) 108 (90 Base) MCG/ACT inhaler Inhale 2 puffs into the lungs every 4 hours as needed for Wheezing or Shortness of Breath      FARXIGA 10 MG tablet TAKE 1 TABLET BY MOUTH ONCE DAILY      ergocalciferol (ERGOCALCIFEROL) 1.25 MG (78393 UT) capsule Take 1 capsule by mouth every 7 days      rosuvastatin (CRESTOR) 20 MG tablet Take 1 tablet by mouth daily      clopidogrel (PLAVIX) 75 MG tablet Take 1 tablet by mouth daily for 21 days 21 tablet 0    QUEtiapine (SEROQUEL) 50 MG tablet Take 1 tablet by

## 2025-05-01 NOTE — PATIENT INSTRUCTIONS
Continue current medications including aspirin, clopidogrel and statin.  Follow up with vascular surgery regarding the carotid stenosis.  Congratulations on stopping smoking. Continue to refrain from smoking.  Monitor blood pressure daily. Follow up with primary care provider.  Health diet and daily exercise.  Keep appointment for sleep study.    Signs and Symptoms of stroke  Sudden numbness or weakness in the face, arm, or leg, especially on one side of the body.  Sudden confusion, trouble speaking, or difficulty understanding speech.  Sudden trouble seeing in one or both eyes.  Sudden trouble walking, dizziness, loss of balance, or lack of coordination.  Sudden severe headache with no known cause.  Call [id number] right away if you or someone else has any of these symptoms.

## 2025-05-07 ENCOUNTER — HOSPITAL ENCOUNTER (OUTPATIENT)
Dept: SLEEP MEDICINE | Facility: HOSPITAL | Age: 60
Discharge: HOME | End: 2025-05-10
Attending: OTOLARYNGOLOGY
Payer: COMMERCIAL

## 2025-05-07 DIAGNOSIS — R29.818 SUSPECTED SLEEP APNEA: ICD-10-CM

## 2025-05-07 PROCEDURE — 95810 POLYSOM 6/> YRS 4/> PARAM: CPT

## 2025-05-08 VITALS
WEIGHT: 196.38 LBS | HEART RATE: 85 BPM | HEIGHT: 73 IN | DIASTOLIC BLOOD PRESSURE: 83 MMHG | SYSTOLIC BLOOD PRESSURE: 140 MMHG | BODY MASS INDEX: 26.03 KG/M2

## 2025-05-08 ASSESSMENT — SLEEP AND FATIGUE QUESTIONNAIRES
HOW LIKELY ARE YOU TO NOD OFF OR FALL ASLEEP WHEN YOU ARE A PASSENGER IN A CAR FOR AN HOUR WITHOUT A BREAK: MODERATE CHANCE OF DOZING
HOW LIKELY ARE YOU TO NOD OFF OR FALL ASLEEP WHILE WATCHING TV: WOULD NEVER DOZE
ESS TOTAL SCORE: 10
HOW LIKELY ARE YOU TO NOD OFF OR FALL ASLEEP IN A CAR, WHILE STOPPED FOR A FEW MINUTES IN TRAFFIC: SLIGHT CHANCE OF DOZING
HOW LIKELY ARE YOU TO NOD OFF OR FALL ASLEEP WHILE LYING DOWN TO REST IN THE AFTERNOON WHEN CIRCUMSTANCES PERMIT: SLIGHT CHANCE OF DOZING
HOW LIKELY ARE YOU TO NOD OFF OR FALL ASLEEP WHILE SITTING AND TALKING TO SOMEONE: SLIGHT CHANCE OF DOZING
HOW LIKELY ARE YOU TO NOD OFF OR FALL ASLEEP WHILE SITTING QUIETLY AFTER LUNCH WITHOUT ALCOHOL: HIGH CHANCE OF DOZING
HOW LIKELY ARE YOU TO NOD OFF OR FALL ASLEEP WHILE SITTING INACTIVE IN A PUBLIC PLACE: SLIGHT CHANCE OF DOZING
HOW LIKELY ARE YOU TO NOD OFF OR FALL ASLEEP WHILE SITTING AND READING: SLIGHT CHANCE OF DOZING

## 2025-05-20 PROBLEM — G47.33 OSA (OBSTRUCTIVE SLEEP APNEA): Status: ACTIVE | Noted: 2025-05-20

## 2025-05-20 PROBLEM — I49.1 PAC (PREMATURE ATRIAL CONTRACTION): Status: ACTIVE | Noted: 2025-05-20

## 2025-06-06 ENCOUNTER — CLINICAL DOCUMENTATION (OUTPATIENT)
Age: 60
End: 2025-06-06